# Patient Record
Sex: MALE | Race: WHITE | Employment: FULL TIME | URBAN - METROPOLITAN AREA
[De-identification: names, ages, dates, MRNs, and addresses within clinical notes are randomized per-mention and may not be internally consistent; named-entity substitution may affect disease eponyms.]

---

## 2017-04-17 ENCOUNTER — OFFICE VISIT (OUTPATIENT)
Dept: FAMILY MEDICINE | Facility: CLINIC | Age: 22
End: 2017-04-17
Payer: COMMERCIAL

## 2017-04-17 VITALS
SYSTOLIC BLOOD PRESSURE: 130 MMHG | TEMPERATURE: 98 F | BODY MASS INDEX: 23.21 KG/M2 | DIASTOLIC BLOOD PRESSURE: 78 MMHG | HEIGHT: 75 IN | HEART RATE: 77 BPM | RESPIRATION RATE: 12 BRPM | OXYGEN SATURATION: 99 % | WEIGHT: 186.7 LBS

## 2017-04-17 DIAGNOSIS — E10.9 TYPE 1 DIABETES MELLITUS WITHOUT COMPLICATION (H): ICD-10-CM

## 2017-04-17 DIAGNOSIS — J35.01 CHRONIC TONSILLITIS: Primary | ICD-10-CM

## 2017-04-17 PROCEDURE — 99214 OFFICE O/P EST MOD 30 MIN: CPT | Performed by: FAMILY MEDICINE

## 2017-04-17 NOTE — NURSING NOTE
"Chief Complaint   Patient presents with     Throat Problem       Initial /78 (BP Location: Left arm, Patient Position: Chair, Cuff Size: Adult Regular)  Pulse 77  Temp 98  F (36.7  C) (Oral)  Resp 12  Ht 6' 2.5\" (1.892 m)  Wt 186 lb 11.2 oz (84.7 kg)  SpO2 99%  BMI 23.65 kg/m2 Estimated body mass index is 23.65 kg/(m^2) as calculated from the following:    Height as of this encounter: 6' 2.5\" (1.892 m).    Weight as of this encounter: 186 lb 11.2 oz (84.7 kg).  Medication Reconciliation: complete   Saleem Carrasco CMA       "

## 2017-04-17 NOTE — PROGRESS NOTES
SUBJECTIVE:                                                    Awilda Whitaker is a 21 year old male who presents to clinic today for the following health issues:    Type I diabetes with sore throat for six months

## 2017-04-17 NOTE — PROGRESS NOTES
"  SUBJECTIVE:                                                    Awilda Whitaker is a 21 year old male who presents to clinic today for the following health issues:    Goes to [facility] for all his Endocrine needs: Type I diabetic     Concern - Throat concern     Onset: Over 1 year    Description:   Sore with white clumps and periodically blood with it    Intensity: mild    Progression of Symptoms:  same    Accompanying Signs & Symptoms:  No       Previous history of similar problem:   No    Precipitating factors:   Worsened by: Smoking    Alleviating factors:  Improved by: No       Therapies Tried and outcome: No      Feels a fullness in his throat almost all the time ?? Chronic tonsillits    Problem list and histories reviewed & adjusted, as indicated.  Additional history: as documented    Type I Diabetes  Hx of Depression    Reviewed and updated as needed this visit by clinical staff       Reviewed and updated as needed this visit by Provider         ROS:  Constitutional, HEENT, cardiovascular, pulmonary, gi and gu systems are negative, except as otherwise noted.    OBJECTIVE:                                                    /78 (BP Location: Left arm, Patient Position: Chair, Cuff Size: Adult Regular)  Pulse 77  Temp 98  F (36.7  C) (Oral)  Resp 12  Ht 6' 2.5\" (1.892 m)  Wt 186 lb 11.2 oz (84.7 kg)  SpO2 99%  BMI 23.65 kg/m2  Body mass index is 23.65 kg/(m^2).  GENERAL: healthy, alert and no distress  EYES: Eyes grossly normal to inspection, PERRL and conjunctivae and sclerae normal  HENT: ear canals and TM's normal, nose and mouth without ulcers or lesions  NECK: no adenopathy, no asymmetry, masses, or scars and thyroid normal to palpation  RESP: lungs clear to auscultation - no rales, rhonchi or wheezes  CV: regular rate and rhythm, normal S1 S2, no S3 or S4, no murmur, click or rub, no peripheral edema and peripheral pulses strong  ABDOMEN: soft, nontender, no hepatosplenomegaly, no masses and bowel " sounds normal  MS: no gross musculoskeletal defects noted, no edema  SKIN: no suspicious lesions or rashes  NEURO: Normal strength and tone, mentation intact and speech normal  PSYCH: mentation appears normal, affect normal/bright         ASSESSMENT/PLAN:                                                        Type I diabetes , not well controlled, discussed hyperglycemia as an infection     Has chronic tonsillitis with guido,: see ENT about T and A          Tom Griffin MD  [facility]

## 2017-04-17 NOTE — MR AVS SNAPSHOT
After Visit Summary   4/17/2017    Awilda Whitaker    MRN: [id number]           Patient Information     Date Of Birth          1995        Visit Information        Provider Department      4/17/2017 3:45 PM Tom Griffin MD [facility]        Today's Diagnoses     Chronic tonsillitis    -  1    Type 1 diabetes mellitus without complication (H)           Follow-ups after your visit        Additional Services     OTOLARYNGOLOGY REFERRAL       Your provider has referred you to: N: [facility] [phone]   [email or web address]    Please be aware that coverage of these services is subject to the terms and limitations of your health insurance plan.  Call member services at your health plan with any benefit or coverage questions.      Please bring the following with you to your appointment:    (1) Any X-Rays, CTs or MRIs which have been performed.  Contact the facility where they were done to arrange for  prior to your scheduled appointment.   (2) List of current medications  (3) This referral request   (4) Any documents/labs given to you for this referral                  Who to contact     If you have questions or need follow up information about today's clinic visit or your schedule please contact [facility] directly at [phone].  Normal or non-critical lab and imaging results will be communicated to you by MyChart, letter or phone within 4 business days after the clinic has received the results. If you do not hear from us within 7 days, please contact the clinic through MyChart or phone. If you have a critical or abnormal lab result, we will notify you by phone as soon as possible.  Submit refill requests through [employer] or call your pharmacy and they will forward the refill request to us. Please allow 3 business days for your refill to be completed.          Additional Information About Your  "Visit        [employer] Information     [employer] lets you send messages to your doctor, view your test results, renew your prescriptions, schedule appointments and more. To sign up, go to [email or web address] . Click on \"Log in\" on the left side of the screen, which will take you to the Welcome page. Then click on \"Sign up Now\" on the right side of the page.     You will be asked to enter the access code listed below, as well as some personal information. Please follow the directions to create your username and password.     Your access code is: [email or web address]  Expires: 2017  4:21 PM     Your access code will  in 90 days. If you need help or a new code, please call your [place] clinic or [phone].        Care EveryWhere ID     This is your Care EveryWhere ID. This could be used by other organizations to access your [place] medical records  [id number]        Your Vitals Were     Pulse Temperature Respirations Height Pulse Oximetry BMI (Body Mass Index)    77 98  F (36.7  C) (Oral) 12 6' 2.5\" (1.892 m) 99% 23.65 kg/m2       Blood Pressure from Last 3 Encounters:   17 130/78   16 122/78    Weight from Last 3 Encounters:   17 186 lb 11.2 oz (84.7 kg)   16 187 lb (84.8 kg)              We Performed the Following     OTOLARYNGOLOGY REFERRAL        Primary Care Provider    Physician No Ref-Primary       No address on file        Thank you!     Thank you for choosing [facility]  for your care. Our goal is always to provide you with excellent care. Hearing back from our patients is one way we can continue to improve our services. Please take a few minutes to complete the written survey that you may receive in the mail after your visit with us. Thank you!             Your Updated Medication List - Protect others around you: Learn how to safely use, store and throw away your medicines at www.disposemymeds.org.          This list is accurate as of: 17  4:21 PM.  " Always use your most recent med list.                   Brand Name Dispense Instructions for use    aspirin 81 MG chewable tablet     108 tablet    Take 1 tablet (81 mg) by mouth daily       LANTUS SC          lisinopril 2.5 MG tablet    PRINIVIL/Zestril    90 tablet    Take 1 tablet (2.5 mg) by mouth daily       NOVOLOG FLEXPEN SC

## 2017-05-16 ENCOUNTER — OFFICE VISIT (OUTPATIENT)
Dept: FAMILY MEDICINE | Facility: CLINIC | Age: 22
End: 2017-05-16
Payer: COMMERCIAL

## 2017-05-16 VITALS
BODY MASS INDEX: 22.41 KG/M2 | DIASTOLIC BLOOD PRESSURE: 75 MMHG | SYSTOLIC BLOOD PRESSURE: 120 MMHG | OXYGEN SATURATION: 96 % | WEIGHT: 176.9 LBS | HEART RATE: 72 BPM | TEMPERATURE: 98.2 F | RESPIRATION RATE: 14 BRPM

## 2017-05-16 DIAGNOSIS — H10.32 ACUTE CONJUNCTIVITIS OF LEFT EYE, UNSPECIFIED ACUTE CONJUNCTIVITIS TYPE: Primary | ICD-10-CM

## 2017-05-16 PROCEDURE — 99213 OFFICE O/P EST LOW 20 MIN: CPT | Performed by: FAMILY MEDICINE

## 2017-05-16 RX ORDER — POLYMYXIN B SULFATE AND TRIMETHOPRIM 1; 10000 MG/ML; [USP'U]/ML
1 SOLUTION OPHTHALMIC EVERY 4 HOURS
Qty: 1 BOTTLE | Refills: 0 | Status: SHIPPED | OUTPATIENT
Start: 2017-05-16 | End: 2017-05-23

## 2017-05-16 NOTE — NURSING NOTE
"Chief Complaint   Patient presents with     Eye Problem     possible pink eye       Initial /75 (BP Location: Right arm, Patient Position: Chair, Cuff Size: Adult Large)  Pulse 72  Temp 98.2  F (36.8  C) (Oral)  Resp 14  Wt 176 lb 14.4 oz (80.2 kg)  SpO2 96%  BMI 22.41 kg/m2 Estimated body mass index is 22.41 kg/(m^2) as calculated from the following:    Height as of 4/17/17: 6' 2.5\" (1.892 m).    Weight as of this encounter: 176 lb 14.4 oz (80.2 kg).  Medication Reconciliation: complete   Saleem Carrasco CMA       "

## 2017-05-16 NOTE — PATIENT INSTRUCTIONS
Wash hands  Follow up as needed  Conjunctivitis, Viral    Viral conjunctivitis (sometimes called pink eye) is a common infection of the eye. It is very contagious. Touching the infected eye, then touching another person passes this infection. It can also be spread from one eye to the other in this same way. The most common symptoms include redness, discharge from the eye, swollen eyelids, and a gritty or scratchy feeling in the eye.  This condition will take about 7 to 10 days to go away. Artificial tears (available without a prescription) are often recommended to moisten and clean the eyes. Antibiotic eye drops often are not recommended because they will not kill the virus. But sometimes they may be prescribed by eye doctors. This is to prevent a second, bacterial infection.  Home care    Apply a towel soaked in cool water to the affected eye 3 to 4 times a day (just before applying medicine to the eye).    It is common to have mucus drainage during the night, causing the eyelids to become crusted by morning. Use a warm, wet cloth to wipe this away.    Launder cloths used to clean the eye after one use. Do not reuse them.    If antibiotic medicines are prescribed, take them exactly as directed. Do not stop taking them until you are told to.    You may use acetaminophen or ibuprofen to control pain, unless another medicine was prescribed. (Note:If you have chronic liver or kidney disease, or if you have ever had a stomach ulcer or gastrointestinal bleeding, talk with your healthcare provider before using these medicines.) Aspirin should never be used in anyone under 18 years of age who is ill with a fever. It may cause severe liver damage.    Wash your hands before and after touching the affected eye. This helps to prevent spreading the infection to your other eye and to others.    The infected person should avoid sharing towels, washcloths, and bedding with others. This is to prevent spreading the  infection.    This illness is contagious during the first week. Children with this illness should be kept out of day care and school until the redness clears.  Follow-up care  Follow up with your healthcare provider, or as advised.  When to seek medical advice  Call your healthcare provider right away if any of these occur:    Worsening vision    Increasing pain in the eye    Increasing swelling or redness of the eyelid    Redness spreading to the face around the eye    Large amount of green or yellow drainage from the eye    Severe itching in or around the eye    Fever of 100.4 F (38 C) or higher    3691-8188 [employer]. [address] [place] [address]. All rights reserved. This information is not intended as a substitute for professional medical care. Always follow your healthcare professional's instructions.

## 2017-05-16 NOTE — MR AVS SNAPSHOT
After Visit Summary   5/16/2017    Awilda Whitaker    MRN: [id number]           Patient Information     Date Of Birth          1995        Visit Information        Provider Department      5/16/2017 1:30 PM Nikky Mike MD [facility]        Today's Diagnoses     Acute conjunctivitis of left eye, unspecified acute conjunctivitis type    -  1      Care Instructions      Wash hands  Follow up as needed  Conjunctivitis, Viral    Viral conjunctivitis (sometimes called pink eye) is a common infection of the eye. It is very contagious. Touching the infected eye, then touching another person passes this infection. It can also be spread from one eye to the other in this same way. The most common symptoms include redness, discharge from the eye, swollen eyelids, and a gritty or scratchy feeling in the eye.  This condition will take about 7 to 10 days to go away. Artificial tears (available without a prescription) are often recommended to moisten and clean the eyes. Antibiotic eye drops often are not recommended because they will not kill the virus. But sometimes they may be prescribed by eye doctors. This is to prevent a second, bacterial infection.  Home care    Apply a towel soaked in cool water to the affected eye 3 to 4 times a day (just before applying medicine to the eye).    It is common to have mucus drainage during the night, causing the eyelids to become crusted by morning. Use a warm, wet cloth to wipe this away.    Launder cloths used to clean the eye after one use. Do not reuse them.    If antibiotic medicines are prescribed, take them exactly as directed. Do not stop taking them until you are told to.    You may use acetaminophen or ibuprofen to control pain, unless another medicine was prescribed. (Note:If you have chronic liver or kidney disease, or if you have ever had a stomach ulcer or gastrointestinal bleeding, talk with your healthcare provider before  using these medicines.) Aspirin should never be used in anyone under 18 years of age who is ill with a fever. It may cause severe liver damage.    Wash your hands before and after touching the affected eye. This helps to prevent spreading the infection to your other eye and to others.    The infected person should avoid sharing towels, washcloths, and bedding with others. This is to prevent spreading the infection.    This illness is contagious during the first week. Children with this illness should be kept out of day care and school until the redness clears.  Follow-up care  Follow up with your healthcare provider, or as advised.  When to seek medical advice  Call your healthcare provider right away if any of these occur:    Worsening vision    Increasing pain in the eye    Increasing swelling or redness of the eyelid    Redness spreading to the face around the eye    Large amount of green or yellow drainage from the eye    Severe itching in or around the eye    Fever of 100.4 F (38 C) or higher    0686-8752 [employer]. [address] [place] [address]. All rights reserved. This information is not intended as a substitute for professional medical care. Always follow your healthcare professional's instructions.              Follow-ups after your visit        Who to contact     If you have questions or need follow up information about today's clinic visit or your schedule please contact [facility] directly at [phone].  Normal or non-critical lab and imaging results will be communicated to you by MyChart, letter or phone within 4 business days after the clinic has received the results. If you do not hear from us within 7 days, please contact the clinic through MyChart or phone. If you have a critical or abnormal lab result, we will notify you by phone as soon as possible.  Submit refill requests through [employer] or call your pharmacy and they will forward the refill  "request to us. Please allow 3 business days for your refill to be completed.          Additional Information About Your Visit        [employer] Information     [employer] lets you send messages to your doctor, view your test results, renew your prescriptions, schedule appointments and more. To sign up, go to [email or web address] . Click on \"Log in\" on the left side of the screen, which will take you to the Welcome page. Then click on \"Sign up Now\" on the right side of the page.     You will be asked to enter the access code listed below, as well as some personal information. Please follow the directions to create your username and password.     Your access code is: [email or web address]  Expires: 2017  4:21 PM     Your access code will  in 90 days. If you need help or a new code, please call your [place] clinic or [phone].        Care EveryWhere ID     This is your Care EveryWhere ID. This could be used by other organizations to access your [place] medical records  [id number]        Your Vitals Were     Pulse Temperature Respirations Pulse Oximetry BMI (Body Mass Index)       72 98.2  F (36.8  C) (Oral) 14 96% 22.41 kg/m2        Blood Pressure from Last 3 Encounters:   17 120/75   17 130/78   16 122/78    Weight from Last 3 Encounters:   17 176 lb 14.4 oz (80.2 kg)   17 186 lb 11.2 oz (84.7 kg)   16 187 lb (84.8 kg)              Today, you had the following     No orders found for display         Today's Medication Changes          These changes are accurate as of: 17  2:08 PM.  If you have any questions, ask your nurse or doctor.               Start taking these medicines.        Dose/Directions    trimethoprim-polymyxin b ophthalmic solution   Commonly known as:  POLYTRIM   Used for:  Acute conjunctivitis of left eye, unspecified acute conjunctivitis type   Started by:  Nikky Mike MD        Dose:  1 drop   Apply 1 drop to eye every 4 hours for 7 " days   Quantity:  1 Bottle   Refills:  0            Where to get your medicines      These medications were sent to [facility] - [place], [place] - [address] AT [facility] [address]  [address], [facility] [address]     Phone:  [phone]     trimethoprim-polymyxin b ophthalmic solution                Primary Care Provider    Physician No Ref-Primary       No address on file        Thank you!     Thank you for choosing [facility]  for your care. Our goal is always to provide you with excellent care. Hearing back from our patients is one way we can continue to improve our services. Please take a few minutes to complete the written survey that you may receive in the mail after your visit with us. Thank you!             Your Updated Medication List - Protect others around you: Learn how to safely use, store and throw away your medicines at www.disposemymeds.org.          This list is accurate as of: 5/16/17  2:08 PM.  Always use your most recent med list.                   Brand Name Dispense Instructions for use    aspirin 81 MG chewable tablet     108 tablet    Take 1 tablet (81 mg) by mouth daily       LANTUS SC          lisinopril 2.5 MG tablet    PRINIVIL/Zestril    90 tablet    Take 1 tablet (2.5 mg) by mouth daily       NOVOLOG FLEXPEN SC          trimethoprim-polymyxin b ophthalmic solution    POLYTRIM    1 Bottle    Apply 1 drop to eye every 4 hours for 7 days

## 2017-05-16 NOTE — PROGRESS NOTES
SUBJECTIVE:                                                    Awilda Whitaker is a 21 year old male who presents to clinic today for the following health issues:      Eye(s) Problem     Onset: 5 days    Description:   Location: left  Pain: YES  Redness: YES    Accompanying Signs & Symptoms:  Discharge/mattering: YES  Swelling: no   Visual changes: YES  Fever: no   Nasal Congestion: YES  Bothered by bright lights: no      History:   Trauma: no   Foreign body exposure: no     Precipitating factors:   Wearing contacts: no     Alleviating factors:  Improved by: eye drops        Therapies Tried and outcome: eye drops    Per patient, he has had pink eye for over 5 days now and hasn't been able to even go to work. Has quite a bit of discharge usually in the mornings      Problem list and histories reviewed & adjusted, as indicated.  Additional history: as documented    Patient Active Problem List   Diagnosis     Type 1 diabetes mellitus without complication (H)     History reviewed. No pertinent surgical history.    Social History   Substance Use Topics     Smoking status: Current Every Day Smoker     Packs/day: 0.20     Types: Cigarettes     Smokeless tobacco: Never Used     Alcohol use 4.2 oz/week     7 Standard drinks or equivalent per week     Family History   Problem Relation Age of Onset     Coronary Artery Disease Maternal Grandmother      d at 68     Lung Cancer Maternal Grandfather      Coronary Artery Disease Paternal Grandfather      s/p CABG           Reviewed and updated as needed this visit by clinical staff       Reviewed and updated as needed this visit by Provider         ROS:  Constitutional, HEENT systems are negative, except as otherwise noted.    OBJECTIVE:                                                    /75 (BP Location: Right arm, Patient Position: Chair, Cuff Size: Adult Large)  Pulse 72  Temp 98.2  F (36.8  C) (Oral)  Resp 14  Wt 176 lb 14.4 oz (80.2 kg)  SpO2 96%  BMI 22.41  kg/m2  Body mass index is 22.41 kg/(m^2).  GENERAL: healthy, alert and no distress  EYES: right eye- clear, left eye- no obvious erythema, scant discharge  HENT: normal cephalic/atraumatic, ear canals and TM's normal, nose and mouth without ulcers or lesions, oropharynx clear and oral mucous membranes moist    Diagnostic Test Results:  none      ASSESSMENT/PLAN:                                                        1. Acute conjunctivitis of left eye, unspecified acute conjunctivitis type  - supportive care discussed   - trimethoprim-polymyxin b (POLYTRIM) ophthalmic solution; Apply 1 drop to eye every 4 hours for 7 days  Dispense: 1 Bottle; Refill: 0    See Patient Instructions    Nikky Mike MD  [facility]

## 2017-06-07 ENCOUNTER — TELEPHONE (OUTPATIENT)
Dept: FAMILY MEDICINE | Facility: CLINIC | Age: 22
End: 2017-06-07

## 2017-06-07 NOTE — TELEPHONE ENCOUNTER
Panel Management Review      Patient has the following on his problem list:     Diabetes    ASA: Passed    Last A1C  Lab Results   Component Value Date    A1C 9.7 08/09/2016     A1C tested: Passed    Last LDL:    No results found for: CHOL  No results found for: HDL  No results found for: LDL  No results found for: TRIG  No results found for: CHOLHDLRATIO  No results found for: NHDL    Is the patient on a Statin? No            Is the patient on Aspirin? YES    Medications     Salicylates    aspirin 81 MG chewable tablet          Last three blood pressure readings:  BP Readings from Last 3 Encounters:   05/16/17 120/75   04/17/17 130/78   08/09/16 122/78       Date of last diabetes office visit: 04/17/2017     Tobacco History:     History   Smoking Status     Current Every Day Smoker     Packs/day: 0.20     Types: Cigarettes   Smokeless Tobacco     Never Used           Composite cancer screening  Chart review shows that this patient is due/due soon for the following None  Summary:    Patient is due/failing the following:   A1C    Action needed:   Patient needs office visit for diabetes and labs.    Type of outreach:    Phone, spoke to patient.  he see's another provider for DM, he will complete LJ so we can get records. LJ mailed to patient.    Questions for provider review:    None                                                                                                                                    Lillian Roth CMA       Chart Closed .

## 2017-06-15 ENCOUNTER — TELEPHONE (OUTPATIENT)
Dept: FAMILY MEDICINE | Facility: CLINIC | Age: 22
End: 2017-06-15

## 2017-06-15 NOTE — TELEPHONE ENCOUNTER
Panel Management Review      Patient has the following on his problem list:     Diabetes    ASA: Passed    Last A1C  Lab Results   Component Value Date    A1C 9.7 08/09/2016     A1C tested: FAILED    Last LDL:    No results found for: CHOL  No results found for: HDL  No results found for: LDL  No results found for: TRIG  No results found for: CHOLHDLRATIO  No results found for: NHDL    Is the patient on a Statin? NO             Is the patient on Aspirin? YES    Medications     Salicylates    aspirin 81 MG chewable tablet          Last three blood pressure readings:  BP Readings from Last 3 Encounters:   05/16/17 120/75   04/17/17 130/78   08/09/16 122/78       Date of last diabetes office visit: 08/09/2016     Tobacco History:     History   Smoking Status     Current Every Day Smoker     Packs/day: 0.20     Types: Cigarettes   Smokeless Tobacco     Never Used           Composite cancer screening  Chart review shows that this patient is due/due soon for the following None  Summary:    Patient is due/failing the following:   A1C and STATIN    Action needed:   Routed to provider for review.    Type of outreach:    None, routed to provider for review.    Questions for provider review:    Pt not currently on Statin and diabetic, can you please review to order statin for patient or contraindicate                                                                                                                                    Caryn Coleman/TAMMIE  [place]---[facility]       Chart routed to Provider .

## 2017-12-14 ENCOUNTER — TELEPHONE (OUTPATIENT)
Dept: FAMILY MEDICINE | Facility: CLINIC | Age: 22
End: 2017-12-14

## 2017-12-14 NOTE — TELEPHONE ENCOUNTER
Panel Management Review      Patient has the following on his problem list:     Diabetes    ASA: Passed    Last A1C  Lab Results   Component Value Date    A1C 9.7 08/09/2016     A1C tested: FAILED    Last LDL:    No results found for: CHOL  No results found for: HDL  No results found for: LDL  No results found for: TRIG  No results found for: CHOLHDLRATIO  No results found for: NHDL    Is the patient on a Statin? NO             Is the patient on Aspirin? YES    Medications     Salicylates    aspirin 81 MG chewable tablet          Last three blood pressure readings:  BP Readings from Last 3 Encounters:   05/16/17 120/75   04/17/17 130/78   08/09/16 122/78       Date of last diabetes office visit: 08/09/2016     Tobacco History:     History   Smoking Status     Current Every Day Smoker     Packs/day: 0.20     Types: Cigarettes   Smokeless Tobacco     Never Used             Composite cancer screening  Chart review shows that this patient is due/due soon for the following None  Summary:    Patient is due/failing the following:   A1C    Action needed:   Patient needs office visit for f/u diabetes.    Type of outreach:    Phone, left message for patient to call back.     Questions for provider review:    None                                                                                                                                    Caryn Coleman/TAMMIE  [place]---[facility]       Chart routed to Care Team .

## 2018-04-23 ENCOUNTER — TELEPHONE (OUTPATIENT)
Dept: FAMILY MEDICINE | Facility: CLINIC | Age: 23
End: 2018-04-23

## 2018-04-23 NOTE — TELEPHONE ENCOUNTER
Panel Management Review      Patient has the following on his problem list:     Diabetes    ASA: Passed    Last A1C  Lab Results   Component Value Date    A1C 9.7 08/09/2016     A1C tested: FAILED    Last LDL:    No results found for: CHOL  No results found for: HDL  No results found for: LDL  No results found for: TRIG  No results found for: CHOLHDLRATIO  No results found for: NHDL    Is the patient on a Statin? NO             Is the patient on Aspirin? YES    Medications     Salicylates    aspirin 81 MG chewable tablet          Last three blood pressure readings:  BP Readings from Last 3 Encounters:   05/16/17 120/75   04/17/17 130/78   08/09/16 122/78       Date of last diabetes office visit: 08/09/2016     Tobacco History:     History   Smoking Status     Current Every Day Smoker     Packs/day: 0.20     Types: Cigarettes   Smokeless Tobacco     Never Used           Composite cancer screening  Chart review shows that this patient is due/due soon for the following None  Summary:    Patient is due/failing the following:   A1C    Action needed:   Patient needs office visit for f/u diabetes.    Type of outreach:    Phone, left message for patient to call back.     Questions for provider review:    None                                                                                                                                    Caryn Coleman/TAMMIE  [place]---[facility]       Chart routed to Care Team .

## 2018-11-03 ENCOUNTER — HOSPITAL ENCOUNTER (EMERGENCY)
Facility: CLINIC | Age: 23
Discharge: HOME | End: 2018-11-03
Attending: PHYSICIAN ASSISTANT | Admitting: PHYSICIAN ASSISTANT
Payer: COMMERCIAL

## 2018-11-03 ENCOUNTER — APPOINTMENT (OUTPATIENT)
Dept: GENERAL RADIOLOGY | Facility: CLINIC | Age: 23
End: 2018-11-03
Attending: PHYSICIAN ASSISTANT
Payer: COMMERCIAL

## 2018-11-03 VITALS
RESPIRATION RATE: 16 BRPM | TEMPERATURE: 98 F | DIASTOLIC BLOOD PRESSURE: 99 MMHG | SYSTOLIC BLOOD PRESSURE: 155 MMHG | OXYGEN SATURATION: 97 %

## 2018-11-03 DIAGNOSIS — S61.210A LACERATION OF RIGHT INDEX FINGER WITHOUT FOREIGN BODY WITHOUT DAMAGE TO NAIL, INITIAL ENCOUNTER: ICD-10-CM

## 2018-11-03 PROCEDURE — 90715 TDAP VACCINE 7 YRS/> IM: CPT | Performed by: PHYSICIAN ASSISTANT

## 2018-11-03 PROCEDURE — 25000128 H RX IP 250 OP 636: Performed by: PHYSICIAN ASSISTANT

## 2018-11-03 PROCEDURE — 99283 EMERGENCY DEPT VISIT LOW MDM: CPT | Mod: 25

## 2018-11-03 PROCEDURE — 73140 X-RAY EXAM OF FINGER(S): CPT | Mod: RT

## 2018-11-03 PROCEDURE — 90471 IMMUNIZATION ADMIN: CPT

## 2018-11-03 PROCEDURE — 12002 RPR S/N/AX/GEN/TRNK2.6-7.5CM: CPT

## 2018-11-03 RX ADMIN — CLOSTRIDIUM TETANI TOXOID ANTIGEN (FORMALDEHYDE INACTIVATED), CORYNEBACTERIUM DIPHTHERIAE TOXOID ANTIGEN (FORMALDEHYDE INACTIVATED), BORDETELLA PERTUSSIS TOXOID ANTIGEN (GLUTARALDEHYDE INACTIVATED), BORDETELLA PERTUSSIS FILAMENTOUS HEMAGGLUTININ ANTIGEN (FORMALDEHYDE INACTIVATED), BORDETELLA PERTUSSIS PERTACTIN ANTIGEN, AND BORDETELLA PERTUSSIS FIMBRIAE 2/3 ANTIGEN 0.5 ML: 5; 2; 2.5; 5; 3; 5 INJECTION, SUSPENSION INTRAMUSCULAR at 20:44

## 2018-11-03 ASSESSMENT — ENCOUNTER SYMPTOMS: WOUND: 1

## 2018-11-03 NOTE — ED AVS SNAPSHOT
[facility] Emergency Department    [address]    [facility] [address]    Phone:  [phone]    Fax:  [phone]                                       Awilda Whitaker   MRN: [id number]    Department:  [facility] Emergency Department   Date of Visit:  11/3/2018           Patient Information     Date Of Birth          1995        Your diagnoses for this visit were:     Laceration of right index finger without foreign body without damage to nail, initial encounter        You were seen by Felicia Pearce PA-C.      Follow-up Information     Follow up with Primary Care Provider.    Why:  in 7-10 days for suture removal.        Follow up with [facility] Emergency Department.    Specialty:  EMERGENCY MEDICINE    Why:  As needed, If symptoms worsen or if you develop any signs of infection such as redness, swelling, drainage, fever    Contact information:    [address]  [facility] [address]  [phone]        Discharge Instructions       Discharge Instructions  Laceration (Cut)    You were seen today for a laceration (cut).  Your provider examined your laceration for any problems such a buried foreign body (like glass, a splinter, or gravel), or injury to blood vessels, tendons, and nerves.  Your provider may have also rinsed and/or scrubbed your laceration to help prevent an infection. It may not be possible to find all problems with your laceration on the first visit; occasionally foreign bodies or a tendon injury can go undetected.    Your laceration may have been closed in one of several ways:    No closure: many wounds will heal just fine without closure.    Stitches: regular stitches that require removal.    Staples: skin staples are often used in the scalp/head.    Wound adhesive (glue): skin glue can be used for certain lacerations and doesn t require removal.    Wound strips (aka Butterfly bandages or steri-strips): these are  bandages that help to close a wound.    Absorbable stitches:  dissolving  stitches that go away on their own and usually don t require removal.    A small percentage of wounds will develop an infection regardless of how well the wound is cared for. Antibiotics are generally not indicated to prevent an infection so are only given for a small number of high-risk wounds. Some lacerations are too high risk to close, and are left open to heal because closure can increase the likelihood that an infection will develop.    Remember that all lacerations, no matter how expertly repaired, will cause scarring. We consider many factors, techniques, and materials, in our efforts to provide the best possible cosmetic outcome.    Generally, every Emergency Department visit should have a follow-up clinic visit with either a primary or a specialty clinic/provider. Please follow-up as instructed by your emergency provider today.     Return to the Emergency Department right away if:    You have more redness, swelling, pain, drainage (pus), a bad smell, or red streaking from your laceration as these symptoms could indicate an infection.    You have a fever of 100.4 F or more.    You have bleeding that you cannot stop at home. If your cut starts to bleed, hold pressure on the bleeding area with a clean cloth or put pressure over the bandage.  If the bleeding does not stop after using constant pressure for 30 minutes, you should return to the Emergency Department for further treatment.    An area past the laceration is cool, pale, or blue compared with the other side, or has a slower return of color when squeezed.    Your dressing seems too tight or starts to get uncomfortable or painful. For children, signs of a problem might be irritability or restlessness.    You have loss of normal function or use of an area, such as being unable to straighten or bend a finger normally.    You have a numb area past the laceration.    Return to the  Emergency Department or see your regular provider if:    The laceration starts to come open.     You have something coming out of the cut or a feeling that there is something in the laceration.    Your wound will not heal, or keeps breaking open. There can always be glass, wood, dirt or other things in any wound.  They will not always show up, even on x-rays.  If a wound does not heal, this may be why, and it is important to follow-up with your regular provider.    Home Care:    Take your dressing off in 12-24 hours, or as instructed by your provider, to check your laceration. Remove the dressing sooner if it seems too tight or painful, or if it is getting numb, tingly, or pale past the dressing.    Gently wash your laceration 1-2 times daily with clean water and mild soap. It is okay to shower or run clean water over the laceration, but do not let the laceration soak in water (no swimming).    If your laceration was closed with wound adhesive or strips: pat it dry and leave it open to the air. For all other repairs: after you wash your laceration, or at least 2 times a day, apply antibiotic ointment (such as Neosporin  or Bacitracin ) to the laceration, then cover it with a Band-Aid  or gauze.    Keep the laceration clean. Wear gloves or other protective clothing if you are around dirt.    Follow-up for removal:    If your wound was closed with staples or regular stitches, they need to be removed according to the instructions and timeline specified by your provider today.    If your wound was closed with absorbable ( dissolving ) sutures, they should fall out, dissolve, or not be visible in about one week. If they are still visible, then they should be removed according to the instructions and timeline specified by your provider today.    Scars:  To help minimize scarring:    Wear sunscreen over the healed laceration when out in the sun.    Massage the area regularly once healed.    You may apply Vitamin E to the  healed wound.    Wait. Scars improve in appearance over months and years.    If you were given a prescription for medicine here today, be sure to read all of the information (including the package insert) that comes with your prescription.  This will include important information about the medicine, its side effects, and any warnings that you need to know about.  The pharmacist who fills the prescription can provide more information and answer questions you may have about the medicine.  If you have questions or concerns that the pharmacist cannot address, please call or return to the Emergency Department.       Remember that you can always come back to the Emergency Department if you are not able to see your regular provider in the amount of time listed above, if you get any new symptoms, or if there is anything that worries you.      24 Hour Appointment Hotline       To make an appointment at any [facility], call [phone] ([phone]). If you don't have a family doctor or clinic, we will help you find one. [facility] are conveniently located to serve the needs of you and your family.             Review of your medicines      Our records show that you are taking the medicines listed below. If these are incorrect, please call your family doctor or clinic.        Dose / Directions Last dose taken    aspirin 81 MG chewable tablet   Dose:  81 mg   Quantity:  108 tablet        Take 1 tablet (81 mg) by mouth daily   Refills:  3        LANTUS SC        Refills:  0        lisinopril 2.5 MG tablet   Commonly known as:  PRINIVIL/Zestril   Dose:  2.5 mg   Quantity:  90 tablet        Take 1 tablet (2.5 mg) by mouth daily   Refills:  3        NOVOLOG FLEXPEN SC        Refills:  0                Procedures and tests performed during your visit     XR Finger Right G/E 2 Views      Orders Needing Specimen Collection     None      Pending Results     No orders found from 11/1/2018 to 11/4/2018.             Pending Culture Results     No orders found from 11/1/2018 to 11/4/2018.            Pending Results Instructions     If you had any lab results that were not finalized at the time of your Discharge, you can call the ED Lab Result RN at [phone]. You will be contacted by this team for any positive Lab results or changes in treatment. The nurses are available 7 days a week from 10A to 6:30P.  You can leave a message 24 hours per day and they will return your call.        Test Results From Your Hospital Stay        11/3/2018  9:16 PM      Narrative     FINGER RIGHT TWO OR MORE VIEWS   11/3/2018 8:58 PM     HISTORY: Laceration over proximal phalanx, evaluate for foreign body.     COMPARISON: None.        Impression     IMPRESSION: No radiopaque foreign object is identified. No evidence of  fracture. Bony alignment is within normal limits.    SHANTHI RODRIGUEZ MD                Clinical Quality Measure: Blood Pressure Screening     Your blood pressure was checked while you were in the emergency department today. The last reading we obtained was  BP: (!) 155/99 . Please read the guidelines below about what these numbers mean and what you should do about them.  If your systolic blood pressure (the top number) is less than 120 and your diastolic blood pressure (the bottom number) is less than 80, then your blood pressure is normal. There is nothing more that you need to do about it.  If your systolic blood pressure (the top number) is 120-139 or your diastolic blood pressure (the bottom number) is 80-89, your blood pressure may be higher than it should be. You should have your blood pressure rechecked within a year by a primary care provider.  If your systolic blood pressure (the top number) is 140 or greater or your diastolic blood pressure (the bottom number) is 90 or greater, you may have high blood pressure. High blood pressure is treatable, but if left untreated over time it can put you at risk for heart attack,  "stroke, or kidney failure. You should have your blood pressure rechecked by a primary care provider within the next 4 weeks.  If your provider in the emergency department today gave you specific instructions to follow-up with your doctor or provider even sooner than that, you should follow that instruction and not wait for up to 4 weeks for your follow-up visit.        Thank you for choosing [facility]       Thank you for choosing [facility] for your care. Our goal is always to provide you with excellent care. Hearing back from our patients is one way we can continue to improve our services. Please take a few minutes to complete the written survey that you may receive in the mail after you visit with us. Thank you!        "[employer]hart Information     [email or web address] lets you send messages to your doctor, view your test results, renew your prescriptions, schedule appointments and more. To sign up, go to [email or web address] . Click on \"Log in\" on the left side of the screen, which will take you to the Welcome page. Then click on \"Sign up Now\" on the right side of the page.     You will be asked to enter the access code listed below, as well as some personal information. Please follow the directions to create your username and password.     Your access code is: [email or web address]  Expires: 2019  9:38 PM     Your access code will  in 90 days. If you need help or a new code, please call your [facility] clinic or [phone].        Care EveryWhere ID     This is your Care EveryWhere ID. This could be used by other organizations to access your [facility] medical records  [id number]        Equal Access to Services     VLAD JOSEPH : Hadmalathi Gonzalez, waaxda luqadaha, qaybta kaalmada janee, yordan black. So [facility] [phone].    ATENCIÓN: Si habla español, tiene a robins disposición servicios gratuitos de asistencia lingüística. Llame al [phone].    We comply with applicable federal " civil rights laws and [place] laws. We do not discriminate on the basis of race, color, national origin, age, disability, sex, sexual orientation, or gender identity.            After Visit Summary       This is your record. Keep this with you and show to your community pharmacist(s) and doctor(s) at your next visit.

## 2018-11-03 NOTE — ED AVS SNAPSHOT
[facility] Emergency Department    [address]    [facility] [address]    Phone:  [phone]    Fax:  [phone]                                       Awilda Whitaker   MRN: [id number]    Department:  [facility] Emergency Department   Date of Visit:  11/3/2018           After Visit Summary Signature Page     I have received my discharge instructions, and my questions have been answered. I have discussed any challenges I see with this plan with the nurse or doctor.    ..........................................................................................................................................  Patient/Patient Representative Signature      ..........................................................................................................................................  Patient Representative Print Name and Relationship to Patient    ..................................................               ................................................  Date                                   Time    ..........................................................................................................................................  Reviewed by Signature/Title    ...................................................              ..............................................  Date                                               Time          22EPIC Rev 08/18

## 2018-11-04 ENCOUNTER — NURSE TRIAGE (OUTPATIENT)
Dept: NURSING | Facility: CLINIC | Age: 23
End: 2018-11-04

## 2018-11-04 NOTE — TELEPHONE ENCOUNTER
Reason for Call/Nurse Assessment:  24 y/o male calls about ER visit to [facility] tonight for cutting finger on a ramirez jar, he had 7 stitches placed. He woke up just now and noticed some more blood on the bandage and was nervous.  Denies that bandage is soaking, does not think it is bleedng now, may have slept on it funny/wrong. Does not complain of increased pain or pain.    RN Action/Disposiiton:  Reviewed protocols briefly, protocols do not quite fit the situation, but advised caller of NSAID's for comfort, elevation of the sutured finger on pillow while sleeping, re-wrap or add more gauze with a little pressure, may also add pressure to wound for 10 minutes if it bleeds again, RN advised to call back with any changes, worsening of symptoms, and questions or concerns.   Patient verbalized understanding of and agreement with plan and had no further questions.     Mahogany Martinez RN - [facility] Nurse Advisor  11/4/2018   2:22AM    Additional Information    Negative: [1] Major abdominal surgical wound AND [2] visible internal organs    Negative: Sounds like a life-threatening emergency to the triager    Negative: [1] Bleeding from wound AND [2] won't stop after 10 minutes of direct pressure    Negative: [1] Suture came out early AND [2] wound gaping AND [3] < 48 hours since sutures placed    Negative: Patient sounds very sick or weak to the triager    Negative: [1] Wound gaping open AND [2] length of opening > 1/2 inch (6 mm) AND [3] > 48 hours since sutures placed    Negative: [1] Wound gaping open AND [2] on the face AND [3] > 48 hours since sutures placed    Negative: Suture removal is overdue    Negative: [1] Suture came out early AND [2] > 48 hours since sutures placed AND [3] caller wants wound checked    Negative: [1] Numbness extends beyond the wound edges AND [2] lasts > 8 hours    Care of sutured wound,  questions about    Negative: [1] Suture came out early AND [2] > 48 hours since sutures placed  (all triage  questions negative)    Protocols used: SUTURE OR STAPLE QUESTIONS-ADULT-AH

## 2018-11-04 NOTE — ED PROVIDER NOTES
History     Chief Complaint:  Laceration     HPI   Awilda Whitaker is a 23 year old type 1 diabetic male, left-handed, who presents to the emergency department today for evaluation of laceration to his right index finger. Patient states he was washing dishes with a sponge when he lacerated his right index finger distally of the MCP joint with a glass cup after forcing the sponge into the cup. Patient notes numbness surrounding the laceration, but sensation intact to the tip of the finger. Patient is uncertain if there was infiltration of glass, patient's friend notes this was unlikely. Of note, patient's last tetanus per [facility] was 2001.    Allergies:  No Known Drug Allergies     Medications:    Novolog  Lantus  Prinivil     Past Medical History:    Type 1 diabetes mellitus  Diabetic ketoacidoses     Past Surgical History:    History reviewed. No pertinent past surgical history.     Family History:    Maternal grandmother: CAD  Maternal grandfather: Lung cancer  Paternal grandfather: CAD and CABG    Social History:  The patient was accompanied to the ED by friend.  Smoking Status: Current Every Day Smoker   Packs/day: 0.20   Types: Cigarettes  Smokeless Tobacco: Never Used  Alcohol Use: Positive  Marital Status: Single      Review of Systems   Skin: Positive for wound.   All other systems reviewed and are negative.    Physical Exam     Patient Vitals for the past 24 hrs:   BP Temp Temp src Heart Rate Resp SpO2   11/03/18 2002 (!) 155/99 98  F (36.7  C) Temporal - - -   11/03/18 2001 - - - 115 16 97 %     Physical Exam   Constitutional: He is oriented to person, place, and time. No distress.   Pulmonary/Chest: Effort normal. No respiratory distress.   Musculoskeletal:        Right hand: He exhibits laceration. He exhibits normal range of motion, no bony tenderness, normal two-point discrimination, normal capillary refill and no deformity. Normal sensation noted. Normal strength noted.   4 cm laceration over the dorsal  aspect of the right second digit over the proximal phalanx and extending just to the PIP. There is no evidence that this penetrates the joint space. He has distal 2 point discrimination intact. Full flexion and extension of the digit at the DIP, PIP, and MCP suggesting no tendon injury.    Neurological: He is alert and oriented to person, place, and time. No cranial nerve deficit.   Skin: Skin is warm and dry. Laceration (right index finger) noted.   Psychiatric: He has a normal mood and affect.         Emergency Department Course     Imaging:  Radiology findings were communicated with the patient who voiced understanding of the findings.    XR Finger Right G/E 2 Views  No radiopaque foreign object is identified. No evidence of  fracture. Bony alignment is within normal limits.  SHANTHI RODRIGUEZ MD  Reading per radiology    Procedures:       Laceration Repair:    Performed by: Felicia Pearce  Authorized by: Felicia Pearce  Consent given by: Patient who states understanding of the procedure being performed after discussing the risks, benefits and alternatives.    Preparation: Patient was prepped and draped in usual sterile fashion.  Irrigation solution: saline    Body area: right index finger  Laceration length: 4cm  Contamination: The wound is not contaminated.  Foreign bodies:none  Tendon involvement: none  Anesthesia: Local  Local anesthetic: Lidocaine     1%  Anesthetic total: 7ml    Debridement: none  Skin closure: Closed with 7 x 4.0 Ethilon  Technique: interrupted  Approximation: close  Approximation difficulty: simple    Patient tolerance: Patient tolerated the procedure well with no immediate complications.      Interventions:  2044 Adacel 0.5 mL IM     Emergency Department Course:    2008 Nursing notes and vitals reviewed.    2010 I performed an exam of the patient as documented above.     2030 The patient was sent for a x-ray while in the emergency department, results above.     2115 I performed laceration  repair as documented above.     2140 Recheck and update.     I personally reviewed the imaging results with the patient and answered all related questions prior to discharge.    Impression & Plan      Medical Decision Making:  Awilda Whitaker is a 23 year old male presented to the Emergency Department with a right index finger laceration.  Given the time of the injury and nature of the laceration, the wound was felt amenable to primary closure. After adequate anesthesia was obtained, the wound was thoroughly irrigated and examined/explored. There is no evidence of muscular, tendon, or bony involvement at this time, nor signs or symptoms of neurovascular compromise.  Additionally, given the mechanism of injury and examination, suspicion for a foreign body was high and imaging was done, which was negative for fracture or foreign body.  The laceration was repaired as noted above.   We discussed appropriate wound care instructions including keeping the wound dry for the first 24-48 hours, followed be gentle cleansing with soap and water.  We discussed application of sunscreen to the affected area once scar has formed, to minimize long term scar formation.  Warning signs of infection (erythema, warmth, worsening pain, drainage of pus) were discussed, which should prompt return to the ER for re-evaluation and the patient verbalized understanding.  Will plan for suture/staple removal in 10-14 days.  Patient was encouraged to return to the ER or follow-up with PCP in the meantime should any new or troubling symptoms develop.      Diagnosis:    ICD-10-CM   1. Laceration of right index finger without foreign body without damage to nail, initial encounter S61.210A     Disposition:   The patient is discharged to home.    Scribe Disclosure:  I, Carlos Enrique Beltran, am serving as a scribe at 8:08 PM on 11/3/2018 to document services personally performed by Felicia Pearce PA-C based on my observations and the provider's statements to  me.    [facility] EMERGENCY DEPARTMENT       Felicia Pearce PA-C  11/03/18 0981

## 2018-11-04 NOTE — ED TRIAGE NOTES
Patient presents to ED due to laceration to R index finger.  State he was washing dishes with sponge lacerating R index finger with glass cup       Hx DM I

## 2019-12-15 ENCOUNTER — HOSPITAL ENCOUNTER (INPATIENT)
Facility: CLINIC | Age: 24
LOS: 2 days | Discharge: HOME | End: 2019-12-17
Attending: EMERGENCY MEDICINE | Admitting: INTERNAL MEDICINE
Payer: COMMERCIAL

## 2019-12-15 DIAGNOSIS — G47.09 OTHER INSOMNIA: Primary | ICD-10-CM

## 2019-12-15 DIAGNOSIS — F10.10 ALCOHOL ABUSE: ICD-10-CM

## 2019-12-15 DIAGNOSIS — E10.10 DIABETIC KETOACIDOSIS WITHOUT COMA ASSOCIATED WITH TYPE 1 DIABETES MELLITUS (H): ICD-10-CM

## 2019-12-15 PROBLEM — E11.10 DKA (DIABETIC KETOACIDOSES): Status: ACTIVE | Noted: 2019-12-15

## 2019-12-15 LAB
ALBUMIN SERPL-MCNC: 5 G/DL (ref 3.4–5)
ALBUMIN UR-MCNC: 70 MG/DL
ALP SERPL-CCNC: 136 U/L (ref 40–150)
ALT SERPL W P-5'-P-CCNC: 54 U/L (ref 0–70)
ANION GAP SERPL CALCULATED.3IONS-SCNC: 13 MMOL/L (ref 3–14)
ANION GAP SERPL CALCULATED.3IONS-SCNC: 15 MMOL/L (ref 6–17)
ANION GAP SERPL CALCULATED.3IONS-SCNC: 21 MMOL/L (ref 3–14)
APPEARANCE UR: CLEAR
AST SERPL W P-5'-P-CCNC: 41 U/L (ref 0–45)
BASE DEFICIT BLDV-SCNC: 12.2 MMOL/L
BASOPHILS # BLD AUTO: 0.1 10E9/L (ref 0–0.2)
BASOPHILS NFR BLD AUTO: 0.5 %
BILIRUB SERPL-MCNC: 1 MG/DL (ref 0.2–1.3)
BILIRUB UR QL STRIP: NEGATIVE
BUN SERPL-MCNC: 14 MG/DL (ref 7–30)
BUN SERPL-MCNC: 16 MG/DL (ref 7–30)
BUN SERPL-MCNC: 18 MG/DL (ref 5–24)
CA-I BLD-SCNC: 4.8 MG/DL (ref 4.4–5.2)
CALCIUM SERPL-MCNC: 8.2 MG/DL (ref 8.5–10.1)
CALCIUM SERPL-MCNC: 9.1 MG/DL (ref 8.5–10.1)
CHLORIDE BLD-SCNC: 104 MMOL/L (ref 94–109)
CHLORIDE SERPL-SCNC: 105 MMOL/L (ref 94–109)
CHLORIDE SERPL-SCNC: 99 MMOL/L (ref 94–109)
CO2 BLD-SCNC: 12 MMOL/L (ref 20–32)
CO2 BLDCOV-SCNC: 11 MMOL/L (ref 21–28)
CO2 SERPL-SCNC: 10 MMOL/L (ref 20–32)
CO2 SERPL-SCNC: 15 MMOL/L (ref 20–32)
COLOR UR AUTO: ABNORMAL
CREAT BLD-MCNC: 0.8 MG/DL (ref 0.66–1.25)
CREAT SERPL-MCNC: 0.67 MG/DL (ref 0.66–1.25)
CREAT SERPL-MCNC: 0.83 MG/DL (ref 0.66–1.25)
DIFFERENTIAL METHOD BLD: ABNORMAL
EOSINOPHIL # BLD AUTO: 0 10E9/L (ref 0–0.7)
EOSINOPHIL NFR BLD AUTO: 0.2 %
ERYTHROCYTE [DISTWIDTH] IN BLOOD BY AUTOMATED COUNT: 11.3 % (ref 10–15)
ETHANOL SERPL-MCNC: 0.02 G/DL
GFR SERPL CREATININE-BSD FRML MDRD: >90 ML/MIN/{1.73_M2}
GLUCOSE BLD-MCNC: 270 MG/DL (ref 70–99)
GLUCOSE BLDC GLUCOMTR-MCNC: 175 MG/DL (ref 70–99)
GLUCOSE BLDC GLUCOMTR-MCNC: 177 MG/DL (ref 70–99)
GLUCOSE BLDC GLUCOMTR-MCNC: 189 MG/DL (ref 70–99)
GLUCOSE BLDC GLUCOMTR-MCNC: 210 MG/DL (ref 70–99)
GLUCOSE BLDC GLUCOMTR-MCNC: 255 MG/DL (ref 70–99)
GLUCOSE SERPL-MCNC: 183 MG/DL (ref 70–99)
GLUCOSE SERPL-MCNC: 256 MG/DL (ref 70–99)
GLUCOSE UR STRIP-MCNC: >1000 MG/DL
HBA1C MFR BLD: 7.1 % (ref 0–5.6)
HCO3 BLDV-SCNC: 14 MMOL/L (ref 21–28)
HCT VFR BLD AUTO: 51.5 % (ref 40–53)
HCT VFR BLD CALC: 53 %PCV (ref 40–53)
HGB BLD CALC-MCNC: 18 G/DL (ref 13.3–17.7)
HGB BLD-MCNC: 17.1 G/DL (ref 13.3–17.7)
HGB UR QL STRIP: ABNORMAL
IMM GRANULOCYTES # BLD: 0 10E9/L (ref 0–0.4)
IMM GRANULOCYTES NFR BLD: 0.3 %
KETONES BLD-SCNC: 4.9 MMOL/L (ref 0–0.6)
KETONES UR STRIP-MCNC: >150 MG/DL
LACTATE BLD-SCNC: 1.8 MMOL/L (ref 0.7–2)
LACTATE BLD-SCNC: 4 MMOL/L (ref 0.7–2.1)
LACTATE BLD-SCNC: 4.1 MMOL/L (ref 0.7–2)
LEUKOCYTE ESTERASE UR QL STRIP: NEGATIVE
LIPASE SERPL-CCNC: 40 U/L (ref 73–393)
LYMPHOCYTES # BLD AUTO: 1 10E9/L (ref 0.8–5.3)
LYMPHOCYTES NFR BLD AUTO: 7.9 %
MAGNESIUM SERPL-MCNC: 2 MG/DL (ref 1.6–2.3)
MCH RBC QN AUTO: 32.7 PG (ref 26.5–33)
MCHC RBC AUTO-ENTMCNC: 33.2 G/DL (ref 31.5–36.5)
MCV RBC AUTO: 99 FL (ref 78–100)
MONOCYTES # BLD AUTO: 0.8 10E9/L (ref 0–1.3)
MONOCYTES NFR BLD AUTO: 6.1 %
MUCOUS THREADS #/AREA URNS LPF: PRESENT /LPF
NEUTROPHILS # BLD AUTO: 10.9 10E9/L (ref 1.6–8.3)
NEUTROPHILS NFR BLD AUTO: 85 %
NITRATE UR QL: NEGATIVE
NRBC # BLD AUTO: 0 10*3/UL
NRBC BLD AUTO-RTO: 0 /100
O2/TOTAL GAS SETTING VFR VENT: ABNORMAL %
OXYHGB MFR BLDV: 86 %
PCO2 BLDV: 31 MM HG (ref 40–50)
PCO2 BLDV: 35 MM HG (ref 40–50)
PH BLDV: 7.11 PH (ref 7.32–7.43)
PH BLDV: 7.25 PH (ref 7.32–7.43)
PH UR STRIP: 5.5 PH (ref 5–7)
PHOSPHATE SERPL-MCNC: 2.3 MG/DL (ref 2.5–4.5)
PLATELET # BLD AUTO: 284 10E9/L (ref 150–450)
PO2 BLDV: 34 MM HG (ref 25–47)
PO2 BLDV: 53 MM HG (ref 25–47)
POTASSIUM BLD-SCNC: 5.5 MMOL/L (ref 3.4–5.3)
POTASSIUM SERPL-SCNC: 4.6 MMOL/L (ref 3.4–5.3)
POTASSIUM SERPL-SCNC: 5.6 MMOL/L (ref 3.4–5.3)
PROT SERPL-MCNC: 8.6 G/DL (ref 6.8–8.8)
RBC # BLD AUTO: 5.23 10E12/L (ref 4.4–5.9)
RBC #/AREA URNS AUTO: <1 /HPF (ref 0–2)
SAO2 % BLDV FROM PO2: 47 %
SODIUM BLD-SCNC: 131 MMOL/L (ref 133–144)
SODIUM SERPL-SCNC: 130 MMOL/L (ref 133–144)
SODIUM SERPL-SCNC: 133 MMOL/L (ref 133–144)
SOURCE: ABNORMAL
SP GR UR STRIP: 1.02 (ref 1–1.03)
UROBILINOGEN UR STRIP-MCNC: NORMAL MG/DL (ref 0–2)
WBC # BLD AUTO: 12.9 10E9/L (ref 4–11)
WBC #/AREA URNS AUTO: <1 /HPF (ref 0–5)

## 2019-12-15 PROCEDURE — 99285 EMERGENCY DEPT VISIT HI MDM: CPT | Mod: 25

## 2019-12-15 PROCEDURE — 25000128 H RX IP 250 OP 636: Performed by: EMERGENCY MEDICINE

## 2019-12-15 PROCEDURE — 00000146 ZZHCL STATISTIC GLUCOSE BY METER IP

## 2019-12-15 PROCEDURE — 25000132 ZZH RX MED GY IP 250 OP 250 PS 637: Performed by: EMERGENCY MEDICINE

## 2019-12-15 PROCEDURE — 83690 ASSAY OF LIPASE: CPT | Performed by: EMERGENCY MEDICINE

## 2019-12-15 PROCEDURE — 83735 ASSAY OF MAGNESIUM: CPT | Performed by: INTERNAL MEDICINE

## 2019-12-15 PROCEDURE — 82803 BLOOD GASES ANY COMBINATION: CPT

## 2019-12-15 PROCEDURE — 80048 BASIC METABOLIC PNL TOTAL CA: CPT | Performed by: INTERNAL MEDICINE

## 2019-12-15 PROCEDURE — 81001 URINALYSIS AUTO W/SCOPE: CPT | Performed by: EMERGENCY MEDICINE

## 2019-12-15 PROCEDURE — 85025 COMPLETE CBC W/AUTO DIFF WBC: CPT | Performed by: EMERGENCY MEDICINE

## 2019-12-15 PROCEDURE — 25800025 ZZH RX 258: Performed by: EMERGENCY MEDICINE

## 2019-12-15 PROCEDURE — 20000003 ZZH R&B ICU

## 2019-12-15 PROCEDURE — 96361 HYDRATE IV INFUSION ADD-ON: CPT

## 2019-12-15 PROCEDURE — 87641 MR-STAPH DNA AMP PROBE: CPT | Performed by: INTERNAL MEDICINE

## 2019-12-15 PROCEDURE — 93005 ELECTROCARDIOGRAM TRACING: CPT

## 2019-12-15 PROCEDURE — 80047 BASIC METABLC PNL IONIZED CA: CPT

## 2019-12-15 PROCEDURE — 83605 ASSAY OF LACTIC ACID: CPT | Performed by: EMERGENCY MEDICINE

## 2019-12-15 PROCEDURE — 96365 THER/PROPH/DIAG IV INF INIT: CPT

## 2019-12-15 PROCEDURE — 25800030 ZZH RX IP 258 OP 636: Performed by: EMERGENCY MEDICINE

## 2019-12-15 PROCEDURE — 99223 1ST HOSP IP/OBS HIGH 75: CPT | Mod: AI | Performed by: INTERNAL MEDICINE

## 2019-12-15 PROCEDURE — 82805 BLOOD GASES W/O2 SATURATION: CPT | Performed by: INTERNAL MEDICINE

## 2019-12-15 PROCEDURE — 82010 KETONE BODYS QUAN: CPT | Performed by: INTERNAL MEDICINE

## 2019-12-15 PROCEDURE — 36415 COLL VENOUS BLD VENIPUNCTURE: CPT | Performed by: INTERNAL MEDICINE

## 2019-12-15 PROCEDURE — 80320 DRUG SCREEN QUANTALCOHOLS: CPT | Performed by: EMERGENCY MEDICINE

## 2019-12-15 PROCEDURE — 25000125 ZZHC RX 250: Performed by: INTERNAL MEDICINE

## 2019-12-15 PROCEDURE — 83605 ASSAY OF LACTIC ACID: CPT

## 2019-12-15 PROCEDURE — 83036 HEMOGLOBIN GLYCOSYLATED A1C: CPT | Performed by: EMERGENCY MEDICINE

## 2019-12-15 PROCEDURE — HZ2ZZZZ DETOXIFICATION SERVICES FOR SUBSTANCE ABUSE TREATMENT: ICD-10-PCS | Performed by: INTERNAL MEDICINE

## 2019-12-15 PROCEDURE — 83605 ASSAY OF LACTIC ACID: CPT | Performed by: INTERNAL MEDICINE

## 2019-12-15 PROCEDURE — C9113 INJ PANTOPRAZOLE SODIUM, VIA: HCPCS | Performed by: INTERNAL MEDICINE

## 2019-12-15 PROCEDURE — 87640 STAPH A DNA AMP PROBE: CPT | Performed by: INTERNAL MEDICINE

## 2019-12-15 PROCEDURE — 25000125 ZZHC RX 250: Performed by: EMERGENCY MEDICINE

## 2019-12-15 PROCEDURE — 25000128 H RX IP 250 OP 636: Performed by: INTERNAL MEDICINE

## 2019-12-15 PROCEDURE — 80053 COMPREHEN METABOLIC PANEL: CPT | Performed by: EMERGENCY MEDICINE

## 2019-12-15 PROCEDURE — 85014 HEMATOCRIT: CPT

## 2019-12-15 PROCEDURE — 25800030 ZZH RX IP 258 OP 636: Performed by: INTERNAL MEDICINE

## 2019-12-15 PROCEDURE — 84100 ASSAY OF PHOSPHORUS: CPT | Performed by: INTERNAL MEDICINE

## 2019-12-15 PROCEDURE — 96375 TX/PRO/DX INJ NEW DRUG ADDON: CPT

## 2019-12-15 RX ORDER — NALOXONE HYDROCHLORIDE 0.4 MG/ML
.1-.4 INJECTION, SOLUTION INTRAMUSCULAR; INTRAVENOUS; SUBCUTANEOUS
Status: DISCONTINUED | OUTPATIENT
Start: 2019-12-15 | End: 2019-12-17 | Stop reason: HOSPADM

## 2019-12-15 RX ORDER — LORAZEPAM 2 MG/ML
1-2 INJECTION INTRAMUSCULAR EVERY 30 MIN PRN
Status: DISCONTINUED | OUTPATIENT
Start: 2019-12-15 | End: 2019-12-17 | Stop reason: HOSPADM

## 2019-12-15 RX ORDER — LORAZEPAM 2 MG/ML
.5-1 INJECTION INTRAMUSCULAR EVERY 4 HOURS PRN
Status: CANCELLED | OUTPATIENT
Start: 2019-12-15

## 2019-12-15 RX ORDER — POTASSIUM CHLORIDE 1.5 G/1.58G
20-40 POWDER, FOR SOLUTION ORAL
Status: DISCONTINUED | OUTPATIENT
Start: 2019-12-15 | End: 2019-12-17 | Stop reason: HOSPADM

## 2019-12-15 RX ORDER — SODIUM CHLORIDE 450 MG/100ML
1000 INJECTION, SOLUTION INTRAVENOUS CONTINUOUS
Status: DISCONTINUED | OUTPATIENT
Start: 2019-12-15 | End: 2019-12-15

## 2019-12-15 RX ORDER — SODIUM CHLORIDE 9 MG/ML
INJECTION, SOLUTION INTRAVENOUS CONTINUOUS
Status: DISCONTINUED | OUTPATIENT
Start: 2019-12-15 | End: 2019-12-15

## 2019-12-15 RX ORDER — ONDANSETRON 2 MG/ML
4 INJECTION INTRAMUSCULAR; INTRAVENOUS EVERY 6 HOURS PRN
Status: DISCONTINUED | OUTPATIENT
Start: 2019-12-15 | End: 2019-12-17 | Stop reason: HOSPADM

## 2019-12-15 RX ORDER — DEXTROSE MONOHYDRATE 25 G/50ML
25-50 INJECTION, SOLUTION INTRAVENOUS
Status: DISCONTINUED | OUTPATIENT
Start: 2019-12-15 | End: 2019-12-16

## 2019-12-15 RX ORDER — NICOTINE POLACRILEX 4 MG
15-30 LOZENGE BUCCAL
Status: DISCONTINUED | OUTPATIENT
Start: 2019-12-15 | End: 2019-12-16

## 2019-12-15 RX ORDER — POTASSIUM CHLORIDE 7.45 MG/ML
10 INJECTION INTRAVENOUS
Status: DISCONTINUED | OUTPATIENT
Start: 2019-12-15 | End: 2019-12-17 | Stop reason: HOSPADM

## 2019-12-15 RX ORDER — FOLIC ACID 1 MG/1
1 TABLET ORAL DAILY
Status: DISCONTINUED | OUTPATIENT
Start: 2019-12-16 | End: 2019-12-17 | Stop reason: HOSPADM

## 2019-12-15 RX ORDER — POTASSIUM CL/LIDO/0.9 % NACL 10MEQ/0.1L
10 INTRAVENOUS SOLUTION, PIGGYBACK (ML) INTRAVENOUS
Status: DISCONTINUED | OUTPATIENT
Start: 2019-12-15 | End: 2019-12-17 | Stop reason: HOSPADM

## 2019-12-15 RX ORDER — MAGNESIUM SULFATE HEPTAHYDRATE 40 MG/ML
4 INJECTION, SOLUTION INTRAVENOUS EVERY 4 HOURS PRN
Status: DISCONTINUED | OUTPATIENT
Start: 2019-12-15 | End: 2019-12-17 | Stop reason: HOSPADM

## 2019-12-15 RX ORDER — LORAZEPAM 1 MG/1
1-2 TABLET ORAL EVERY 30 MIN PRN
Status: DISCONTINUED | OUTPATIENT
Start: 2019-12-15 | End: 2019-12-17 | Stop reason: HOSPADM

## 2019-12-15 RX ORDER — INSULIN LISPRO 100 [IU]/ML
1-10 INJECTION, SOLUTION INTRAVENOUS; SUBCUTANEOUS
COMMUNITY

## 2019-12-15 RX ORDER — MULTIPLE VITAMINS W/ MINERALS TAB 9MG-400MCG
1 TAB ORAL DAILY
Status: DISCONTINUED | OUTPATIENT
Start: 2019-12-16 | End: 2019-12-17 | Stop reason: HOSPADM

## 2019-12-15 RX ORDER — ACETAMINOPHEN 325 MG/1
650 TABLET ORAL EVERY 4 HOURS PRN
Status: DISCONTINUED | OUTPATIENT
Start: 2019-12-15 | End: 2019-12-17 | Stop reason: HOSPADM

## 2019-12-15 RX ORDER — AMOXICILLIN 250 MG
2 CAPSULE ORAL 2 TIMES DAILY PRN
Status: DISCONTINUED | OUTPATIENT
Start: 2019-12-15 | End: 2019-12-17 | Stop reason: HOSPADM

## 2019-12-15 RX ORDER — LANOLIN ALCOHOL/MO/W.PET/CERES
100 CREAM (GRAM) TOPICAL DAILY
Status: DISCONTINUED | OUTPATIENT
Start: 2019-12-16 | End: 2019-12-17 | Stop reason: HOSPADM

## 2019-12-15 RX ORDER — ONDANSETRON 4 MG/1
4 TABLET, ORALLY DISINTEGRATING ORAL EVERY 6 HOURS PRN
Status: DISCONTINUED | OUTPATIENT
Start: 2019-12-15 | End: 2019-12-17 | Stop reason: HOSPADM

## 2019-12-15 RX ORDER — IBUPROFEN 600 MG/1
600 TABLET, FILM COATED ORAL ONCE
Status: COMPLETED | OUTPATIENT
Start: 2019-12-15 | End: 2019-12-15

## 2019-12-15 RX ORDER — POTASSIUM CHLORIDE 1500 MG/1
20-40 TABLET, EXTENDED RELEASE ORAL
Status: DISCONTINUED | OUTPATIENT
Start: 2019-12-15 | End: 2019-12-17 | Stop reason: HOSPADM

## 2019-12-15 RX ORDER — ONDANSETRON 2 MG/ML
4 INJECTION INTRAMUSCULAR; INTRAVENOUS EVERY 30 MIN PRN
Status: DISCONTINUED | OUTPATIENT
Start: 2019-12-15 | End: 2019-12-15

## 2019-12-15 RX ORDER — AMOXICILLIN 250 MG
1 CAPSULE ORAL 2 TIMES DAILY PRN
Status: DISCONTINUED | OUTPATIENT
Start: 2019-12-15 | End: 2019-12-17 | Stop reason: HOSPADM

## 2019-12-15 RX ORDER — MULTIPLE VITAMINS W/ MINERALS TAB 9MG-400MCG
1 TAB ORAL DAILY
COMMUNITY

## 2019-12-15 RX ORDER — POTASSIUM CHLORIDE 29.8 MG/ML
20 INJECTION INTRAVENOUS
Status: DISCONTINUED | OUTPATIENT
Start: 2019-12-15 | End: 2019-12-17 | Stop reason: HOSPADM

## 2019-12-15 RX ADMIN — SODIUM CHLORIDE 1000 ML: 9 INJECTION, SOLUTION INTRAVENOUS at 19:48

## 2019-12-15 RX ADMIN — Medication 2 UNITS/HR: at 20:43

## 2019-12-15 RX ADMIN — DEXTROSE AND SODIUM CHLORIDE 1000 ML: 5; 450 INJECTION, SOLUTION INTRAVENOUS at 21:33

## 2019-12-15 RX ADMIN — PANTOPRAZOLE SODIUM 40 MG: 40 INJECTION, POWDER, FOR SOLUTION INTRAVENOUS at 22:45

## 2019-12-15 RX ADMIN — ONDANSETRON HYDROCHLORIDE 4 MG: 2 INJECTION, SOLUTION INTRAMUSCULAR; INTRAVENOUS at 19:48

## 2019-12-15 RX ADMIN — IBUPROFEN 600 MG: 600 TABLET, FILM COATED ORAL at 21:06

## 2019-12-15 RX ADMIN — FOLIC ACID: 5 INJECTION, SOLUTION INTRAMUSCULAR; INTRAVENOUS; SUBCUTANEOUS at 22:36

## 2019-12-15 RX ADMIN — DEXTROSE AND SODIUM CHLORIDE: 5; 900 INJECTION, SOLUTION INTRAVENOUS at 22:39

## 2019-12-15 ASSESSMENT — ENCOUNTER SYMPTOMS
VOMITING: 1
DIARRHEA: 0
FEVER: 0

## 2019-12-15 NOTE — LETTER
Transition Communication Hand-off for Care Transitions to Next Level of Care Provider    Name: Awilda Whitaker  : 1995  MRN #: [id number]  Primary Care Provider: [facility]     Primary Clinic: [address] [place] [address]  Primary Care Clinic Name: (needs to establish care)  Reason for Hospitalization:  Diabetic ketoacidosis without coma associated with type 1 diabetes mellitus (H) [E10.10]  Admit Date/Time: 12/15/2019  7:28 PM  Discharge Date: 19  Payor Source: Payor: BCBS / Plan: BCBS OUT OF STATE / Product Type: Indemnity /     Readmission Assessment Measure (BLADIMIR) Risk Score/category: Low           Reason for Communication Hand-off Referral: Fragility    Discharge Plan: Home       Concern for non-adherence with plan of care:   Yes  Discharge Needs Assessment:  Needs      Most Recent Value   Anticipated Changes Related to Illness  none   # of Referrals Placed by CTS  Scheduled Follow-up appointments          Already enrolled in Tele-monitoring program and name of program:  NA  Follow-up specialty is recommended: Yes    Follow-up plan:    Future Appointments   Date Time Provider Department Center   2019  8:40 AM [facility]       Any outstanding tests or procedures:             Key Recommendations:  RN CTS following d/t DKA and no PCP listed in patient chart. Patient admitted with c/o nausea & vomiting found to have DKA. Patient is a daily alcohol drinker and has been seen by CD while in hospital.   Patient follows closely with endocrine, Dr. Long, with [facility], for care. Explained importance of also being established with primary medicine for additional medical needs. Patient understanding and agreeable. Patient expressed interest in obtaining a glucagon kit. Patient currently obtains insulin from [facility] as this is best for current insurance coverage. Patient states will be transitioning to own insurance with a high deductible and is  concerned about insulin costs moving forward. Pharmacy liaison consult placed to review current insulin costs and for possible available discounts. Encouraged patient to continue discussion with provider and outpatient pharmacy for insulin options.   Patient has a glucometer at home. Patient does carb counting. Patient states is decreasing amount of fast food intake and is cooking more at home.     Patient will establish care at [facility].   Please follow up with patient for CD follow up, diabetes management, and medication resources. Patient was discharged home on gabapentin.     Karina Dos Santos MA/RN Case Manager  Inpatient Care Coordination  [facility]   [phone]    AVS/Discharge Summary is the source of truth; this is a helpful guide for improved communication of patient story

## 2019-12-16 LAB
ALBUMIN SERPL-MCNC: 3.4 G/DL (ref 3.4–5)
ALP SERPL-CCNC: 90 U/L (ref 40–150)
ALT SERPL W P-5'-P-CCNC: 33 U/L (ref 0–70)
ANION GAP SERPL CALCULATED.3IONS-SCNC: 10 MMOL/L (ref 3–14)
ANION GAP SERPL CALCULATED.3IONS-SCNC: 10 MMOL/L (ref 3–14)
ANION GAP SERPL CALCULATED.3IONS-SCNC: 9 MMOL/L (ref 3–14)
AST SERPL W P-5'-P-CCNC: 17 U/L (ref 0–45)
BILIRUB DIRECT SERPL-MCNC: 0.2 MG/DL (ref 0–0.2)
BILIRUB SERPL-MCNC: 1.1 MG/DL (ref 0.2–1.3)
BUN SERPL-MCNC: 13 MG/DL (ref 7–30)
BUN SERPL-MCNC: 13 MG/DL (ref 7–30)
BUN SERPL-MCNC: 14 MG/DL (ref 7–30)
CALCIUM SERPL-MCNC: 8 MG/DL (ref 8.5–10.1)
CHLORIDE SERPL-SCNC: 107 MMOL/L (ref 94–109)
CHLORIDE SERPL-SCNC: 109 MMOL/L (ref 94–109)
CHLORIDE SERPL-SCNC: 110 MMOL/L (ref 94–109)
CO2 SERPL-SCNC: 18 MMOL/L (ref 20–32)
CO2 SERPL-SCNC: 18 MMOL/L (ref 20–32)
CO2 SERPL-SCNC: 19 MMOL/L (ref 20–32)
CREAT SERPL-MCNC: 0.65 MG/DL (ref 0.66–1.25)
CREAT SERPL-MCNC: 0.66 MG/DL (ref 0.66–1.25)
CREAT SERPL-MCNC: 0.67 MG/DL (ref 0.66–1.25)
GFR SERPL CREATININE-BSD FRML MDRD: >90 ML/MIN/{1.73_M2}
GLUCOSE BLDC GLUCOMTR-MCNC: 135 MG/DL (ref 70–99)
GLUCOSE BLDC GLUCOMTR-MCNC: 138 MG/DL (ref 70–99)
GLUCOSE BLDC GLUCOMTR-MCNC: 139 MG/DL (ref 70–99)
GLUCOSE BLDC GLUCOMTR-MCNC: 142 MG/DL (ref 70–99)
GLUCOSE BLDC GLUCOMTR-MCNC: 143 MG/DL (ref 70–99)
GLUCOSE BLDC GLUCOMTR-MCNC: 150 MG/DL (ref 70–99)
GLUCOSE BLDC GLUCOMTR-MCNC: 152 MG/DL (ref 70–99)
GLUCOSE BLDC GLUCOMTR-MCNC: 154 MG/DL (ref 70–99)
GLUCOSE BLDC GLUCOMTR-MCNC: 155 MG/DL (ref 70–99)
GLUCOSE BLDC GLUCOMTR-MCNC: 156 MG/DL (ref 70–99)
GLUCOSE BLDC GLUCOMTR-MCNC: 156 MG/DL (ref 70–99)
GLUCOSE BLDC GLUCOMTR-MCNC: 157 MG/DL (ref 70–99)
GLUCOSE BLDC GLUCOMTR-MCNC: 160 MG/DL (ref 70–99)
GLUCOSE BLDC GLUCOMTR-MCNC: 161 MG/DL (ref 70–99)
GLUCOSE BLDC GLUCOMTR-MCNC: 161 MG/DL (ref 70–99)
GLUCOSE BLDC GLUCOMTR-MCNC: 171 MG/DL (ref 70–99)
GLUCOSE BLDC GLUCOMTR-MCNC: 172 MG/DL (ref 70–99)
GLUCOSE BLDC GLUCOMTR-MCNC: 186 MG/DL (ref 70–99)
GLUCOSE BLDC GLUCOMTR-MCNC: 187 MG/DL (ref 70–99)
GLUCOSE BLDC GLUCOMTR-MCNC: 194 MG/DL (ref 70–99)
GLUCOSE BLDC GLUCOMTR-MCNC: 203 MG/DL (ref 70–99)
GLUCOSE BLDC GLUCOMTR-MCNC: 234 MG/DL (ref 70–99)
GLUCOSE SERPL-MCNC: 160 MG/DL (ref 70–99)
GLUCOSE SERPL-MCNC: 163 MG/DL (ref 70–99)
GLUCOSE SERPL-MCNC: 211 MG/DL (ref 70–99)
INTERPRETATION ECG - MUSE: NORMAL
KETONES BLD-SCNC: 1.6 MMOL/L (ref 0–0.6)
MRSA DNA SPEC QL NAA+PROBE: NEGATIVE
PHOSPHATE SERPL-MCNC: 2.3 MG/DL (ref 2.5–4.5)
POTASSIUM SERPL-SCNC: 3.8 MMOL/L (ref 3.4–5.3)
POTASSIUM SERPL-SCNC: 3.8 MMOL/L (ref 3.4–5.3)
POTASSIUM SERPL-SCNC: 4 MMOL/L (ref 3.4–5.3)
PROT SERPL-MCNC: 6 G/DL (ref 6.8–8.8)
SODIUM SERPL-SCNC: 135 MMOL/L (ref 133–144)
SODIUM SERPL-SCNC: 137 MMOL/L (ref 133–144)
SODIUM SERPL-SCNC: 138 MMOL/L (ref 133–144)
SPECIMEN SOURCE: NORMAL

## 2019-12-16 PROCEDURE — 80076 HEPATIC FUNCTION PANEL: CPT | Performed by: INTERNAL MEDICINE

## 2019-12-16 PROCEDURE — 20000003 ZZH R&B ICU

## 2019-12-16 PROCEDURE — 00000146 ZZHCL STATISTIC GLUCOSE BY METER IP

## 2019-12-16 PROCEDURE — 36415 COLL VENOUS BLD VENIPUNCTURE: CPT | Performed by: INTERNAL MEDICINE

## 2019-12-16 PROCEDURE — 82010 KETONE BODYS QUAN: CPT | Performed by: INTERNAL MEDICINE

## 2019-12-16 PROCEDURE — 80048 BASIC METABOLIC PNL TOTAL CA: CPT | Performed by: INTERNAL MEDICINE

## 2019-12-16 PROCEDURE — 40000007 ZZH STATISTIC ADULT CD FACE TO FACE-NO CHRG

## 2019-12-16 PROCEDURE — C9113 INJ PANTOPRAZOLE SODIUM, VIA: HCPCS | Performed by: INTERNAL MEDICINE

## 2019-12-16 PROCEDURE — 25000131 ZZH RX MED GY IP 250 OP 636 PS 637: Performed by: INTERNAL MEDICINE

## 2019-12-16 PROCEDURE — 25800030 ZZH RX IP 258 OP 636: Performed by: INTERNAL MEDICINE

## 2019-12-16 PROCEDURE — 25000128 H RX IP 250 OP 636: Performed by: INTERNAL MEDICINE

## 2019-12-16 PROCEDURE — 25000125 ZZHC RX 250: Performed by: INTERNAL MEDICINE

## 2019-12-16 PROCEDURE — 25000132 ZZH RX MED GY IP 250 OP 250 PS 637: Performed by: SURGERY

## 2019-12-16 PROCEDURE — 25000132 ZZH RX MED GY IP 250 OP 250 PS 637: Performed by: INTERNAL MEDICINE

## 2019-12-16 PROCEDURE — 84100 ASSAY OF PHOSPHORUS: CPT | Performed by: INTERNAL MEDICINE

## 2019-12-16 PROCEDURE — 99233 SBSQ HOSP IP/OBS HIGH 50: CPT | Performed by: INTERNAL MEDICINE

## 2019-12-16 RX ORDER — NICOTINE 21 MG/24HR
1 PATCH, TRANSDERMAL 24 HOURS TRANSDERMAL DAILY
Status: DISCONTINUED | OUTPATIENT
Start: 2019-12-16 | End: 2019-12-17 | Stop reason: HOSPADM

## 2019-12-16 RX ORDER — DEXTROSE MONOHYDRATE 25 G/50ML
25-50 INJECTION, SOLUTION INTRAVENOUS
Status: DISCONTINUED | OUTPATIENT
Start: 2019-12-16 | End: 2019-12-17 | Stop reason: HOSPADM

## 2019-12-16 RX ORDER — NICOTINE POLACRILEX 4 MG
15-30 LOZENGE BUCCAL
Status: DISCONTINUED | OUTPATIENT
Start: 2019-12-16 | End: 2019-12-17 | Stop reason: HOSPADM

## 2019-12-16 RX ORDER — TRAZODONE HYDROCHLORIDE 50 MG/1
50 TABLET, FILM COATED ORAL AT BEDTIME
Status: DISCONTINUED | OUTPATIENT
Start: 2019-12-16 | End: 2019-12-17 | Stop reason: HOSPADM

## 2019-12-16 RX ADMIN — Medication: at 13:15

## 2019-12-16 RX ADMIN — ACETAMINOPHEN 650 MG: 325 TABLET, FILM COATED ORAL at 19:56

## 2019-12-16 RX ADMIN — DEXTROSE AND SODIUM CHLORIDE: 5; 900 INJECTION, SOLUTION INTRAVENOUS at 14:20

## 2019-12-16 RX ADMIN — POTASSIUM CHLORIDE 20 MEQ: 1500 TABLET, EXTENDED RELEASE ORAL at 09:42

## 2019-12-16 RX ADMIN — FOLIC ACID 1 MG: 1 TABLET ORAL at 08:06

## 2019-12-16 RX ADMIN — PANTOPRAZOLE SODIUM 40 MG: 40 INJECTION, POWDER, FOR SOLUTION INTRAVENOUS at 08:06

## 2019-12-16 RX ADMIN — NICOTINE 1 PATCH: 21 PATCH, EXTENDED RELEASE TRANSDERMAL at 02:43

## 2019-12-16 RX ADMIN — DEXTROSE AND SODIUM CHLORIDE: 5; 900 INJECTION, SOLUTION INTRAVENOUS at 06:16

## 2019-12-16 RX ADMIN — SODIUM PHOSPHATE, MONOBASIC, MONOHYDRATE 15 MMOL: 276; 142 INJECTION, SOLUTION INTRAVENOUS at 03:56

## 2019-12-16 RX ADMIN — MULTIPLE VITAMINS W/ MINERALS TAB 1 TABLET: TAB at 08:06

## 2019-12-16 RX ADMIN — NICOTINE 1 PATCH: 21 PATCH, EXTENDED RELEASE TRANSDERMAL at 08:05

## 2019-12-16 RX ADMIN — INSULIN DETEMIR 25 UNITS: 100 INJECTION, SOLUTION SUBCUTANEOUS at 22:02

## 2019-12-16 RX ADMIN — Medication 100 MG: at 08:06

## 2019-12-16 ASSESSMENT — ACTIVITIES OF DAILY LIVING (ADL)
ADLS_ACUITY_SCORE: 10
ADLS_ACUITY_SCORE: 12
ADLS_ACUITY_SCORE: 10

## 2019-12-16 ASSESSMENT — MIFFLIN-ST. JEOR: SCORE: 1873.63

## 2019-12-16 NOTE — ED NOTES
[facility]  ED Nurse Handoff Report    Awilda Whitaker is a 24 year old male   ED Chief complaint: Blood Sugar Problem  . ED Diagnosis:   Final diagnoses:   Diabetic ketoacidosis without coma associated with type 1 diabetes mellitus (H)     Allergies: No Known Allergies    Code Status: Full Code  Activity level - Baseline/Home:  Independent. Activity Level - Current:   Independent. Lift room needed: No. Bariatric: No   Needed: No   Isolation: No. Infection: Not Applicable.     Vital Signs:   Vitals:    12/15/19 1945 12/15/19 2000 12/15/19 2015 12/15/19 2100   BP: (!) 142/88 (!) 140/84  (!) 125/93   Pulse: 95 103 102 103   Resp: 21 19 28 18   Temp:       TempSrc:       SpO2: 100% 100% 100% 100%       Cardiac Rhythm:  ,      Pain level: 0-10 Pain Scale: 8  Patient confused: No. Patient Falls Risk: No.   Elimination Status: Has voided   Patient Report - Initial Complaint: Vomiting and high blood sugar. Focused Assessment: Pt slightly tremulous and diaphoretic. He states he laid in bed for 1 day due to vomiting and feeling sick. Upon further discussion he reports being a daily drinker with no history of withdrawal.  Tests Performed: blood, UA. Abnormal Results:   Labs Ordered and Resulted from Time of ED Arrival Up to the Time of Departure from the ED   HEMOGLOBIN A1C - Abnormal; Notable for the following components:       Result Value    Hemoglobin A1C 7.1 (*)     All other components within normal limits   CBC WITH PLATELETS DIFFERENTIAL - Abnormal; Notable for the following components:    WBC 12.9 (*)     Absolute Neutrophil 10.9 (*)     All other components within normal limits   COMPREHENSIVE METABOLIC PANEL - Abnormal; Notable for the following components:    Sodium 130 (*)     Potassium 5.6 (*)     Carbon Dioxide 10 (*)     Anion Gap 21 (*)     Glucose 256 (*)     All other components within normal limits   LIPASE - Abnormal; Notable for the following components:    Lipase 40 (*)     All  other components within normal limits   GLUCOSE BY METER - Abnormal; Notable for the following components:    Glucose 255 (*)     All other components within normal limits   ALCOHOL ETHYL - Abnormal; Notable for the following components:    Ethanol g/dL 0.02 (*)     All other components within normal limits   LACTIC ACID WHOLE BLOOD - Abnormal; Notable for the following components:    Lactic Acid 4.1 (*)     All other components within normal limits   GLUCOSE BY METER - Abnormal; Notable for the following components:    Glucose 210 (*)     All other components within normal limits   ISTAT BASIC MET ICA HCT POCT - Abnormal; Notable for the following components:    Sodium 131 (*)     Potassium 5.5 (*)     Total CO2 12 (*)     Glucose 270 (*)     Hemoglobin 18.0 (*)     All other components within normal limits   ISTAT  GASES LACTATE SORAIDA POCT - Abnormal; Notable for the following components:    Ph Venous 7.11 (*)     PCO2 Venous 35 (*)     Bicarbonate Venous 11 (*)     Lactic Acid 4.0 (*)     All other components within normal limits   GLUCOSE MONITOR NURSING POCT   ROUTINE UA WITH MICROSCOPIC   CARDIAC CONTINUOUS MONITORING   NOTIFY PHYSICIAN   PERIPHERAL IV CATHETER   IV ACCESS     .   Treatments provided: 2 liter bolus, insulin drip started, Ibuprofen for HA  Family Comments: Family at bedside, they were not present for discussion of etoh  OBS brochure/video discussed/provided to patient:  N/A  ED Medications:   Medications   insulin 1 unit/1 mL in NS (NovoLIN, HumuLIN Regular) drip -ED DKA algorithm (2 Units/hr Intravenous New Bag 12/15/19 2043)   ondansetron (ZOFRAN) injection 4 mg (4 mg Intravenous Given 12/15/19 1948)   dextrose 5% and 0.45% NaCl infusion (has no administration in time range)   0.9% sodium chloride BOLUS (0 mLs Intravenous Stopped 12/15/19 2106)   0.9% sodium chloride BOLUS (0 mLs Intravenous Stopped 12/15/19 2044)   ibuprofen (ADVIL/MOTRIN) tablet 600 mg (600 mg Oral Given 12/15/19 2106)      Drips infusing:  Yes  For the majority of the shift, the patient's behavior Green. Interventions performed were NA.     Severe Sepsis OR Septic Shock Diagnosis Present: No      ED Nurse Name/Phone Number: Rima Flores RN,   9:19 PM

## 2019-12-16 NOTE — H&P
[facility]  Hospitalist Admission Note  Name: Awilda Whitaker    MRN: [id number]  YOB: 1995    Age: 24 year old  Date of admission: 12/15/2019  Primary care provider: No Ref-Primary, Physician    Chief Complaint:  DKA    Awilda Whitaker is a 24 year old male with PMH including type 1 diabetes diagnosed at age 6, daily alcohol use who presents with nausea, vomiting and malaise.  He vomited about 8 times today at home, all of which were nonbloody.  He has a ketosis test at home which he checked and was positive.  Blood sugar was up to 300 this morning.  He notes he has been able to drink water but really not any food.  He does admit to normally drinking about 4 vodka drinks per day but recently alludes to the idea that he has been drinking more due to social stresses.      Here in the ER he was noted to be acidotic with pH of 7.11 and bicarbonate level of 11.  Lactic acid was elevated at 4.1.  Lipase was negative.  Hemoglobin A1c was 7.1.  He was felt clinically to be in DKA and was given 2 L IV fluid and started on insulin drip.    Assessment and Plan:   1. Diabetic ketoacidosis: As above.  Possible triggers increased social stress with ongoing alcohol abuse.  Currently nontoxic-appearing and mentation is intact.  --Admit to the ICU  --Insulin drip  --D5 normal saline at 150 cc/h  --Trend ketones, lactic acid, kidney function and electrolytes overnight    2.   Type 1 diabetes: He tells me he was diagnosed at age 6.  Normally takes long-acting insulin 25 units at night and with meals takes 1 unit per 10 g carb.  Overall seems to have good control, recent events not withstanding.    3.   Alcohol abuse, risk for withdrawal: Currently does not appear to be in alcohol withdrawal though he is somewhat tachycardic which may be primarily due to #1 above.  That said, I do think he is at significant risk for withdrawal so we will have Ativan available as per [facility] protocol and also give IV then oral  vitamins.  --Anticipate CD consult in the next day or 2.    4.   Suspected gastritis versus esophagitis: We will empirically start IV Protonix.  Suspect alcohol related.  Denies melena.      DVT Prophylaxis: Pneumatic Compression Devices  Code Status: Full Code  Discharge Dispo: Admit to inpatient status        History of Present Illness:  Awilda Whitaker is a 24 year old male with PMH including type 1 diabetes diagnosed at age 6, daily alcohol use who presents with nausea, vomiting and malaise.  He is accompanied by his mom who provides some of the history.  He vomited about 8 times today at home, all of which were nonbloody.  He has a ketosis test at home which he checked and was positive.  Blood sugar was up to 300 this morning.  He notes he has been able to drink water but really not any food.  Normally blood sugars are around the mid 100s.  He notes he is under some increased stress having recently gone through a break-up.  Normally he drinks about 4 vodka drinks per day but recently alludes to the idea that he has been drinking more.  He is interested in talking with a CD counselor or social work regarding possible outpatient treatment.  He denies ever having gone through alcohol withdrawal in the past but does notice that he sleeps poorly if he does not drink alcohol.    He denies any melena.  He has had some chest discomfort worse lying flat which he feels is severe acid reflux.    Here in the ER he was noted to be acidotic with pH of 7.11 and bicarbonate level of 11.  Lactic acid was elevated at 4.1.  Lipase was negative.  Hemoglobin A1c was 7.1.  He was felt clinically to be in DKA and was given 2 L IV fluid and started on insulin drip.     Past Medical History:  Past Medical History:   Diagnosis Date     DKA (diabetic ketoacidoses) (H)      Past Surgical History:  No known significant past surgical history    Social History:  See above regarding alcohol  Use.    Family History:  Family History   Problem  Relation Age of Onset     Coronary Artery Disease Maternal Grandmother         d at 68     Lung Cancer Maternal Grandfather      Coronary Artery Disease Paternal Grandfather         s/p CABG     Allergies:  No Known Allergies  Medications:  No current facility-administered medications on file prior to encounter.   aspirin (ASA) 325 MG EC tablet, Take 325 mg by mouth daily  insulin detemir (LEVEMIR PEN) 100 UNIT/ML pen, Inject 25 Units Subcutaneous At Bedtime  insulin lispro (HUMALOG PEN) 100 UNIT/ML (1 unit dial) pen, Inject 1-10 Units Subcutaneous 4 times daily (with meals and nightly) 1 unit / 10 carbs  multivitamin w/minerals (THERA-VIT-M) tablet, Take 1 tablet by mouth daily        Review of Systems:  A Comprehensive greater than 10 system review of systems was carried out.  Pertinent positives and negatives are noted above.  Otherwise negative for contributory information.     Physical Exam:  Blood pressure (!) 125/93, pulse 103, temperature 97.9  F (36.6  C), temperature source Temporal, resp. rate 18, SpO2 100 %.  Wt Readings from Last 1 Encounters:   05/16/17 80.2 kg (176 lb 14.4 oz)     Exam:  General: Alert, awake, no acute distress.  Slightly pale but nontoxic.  HEENT: NC/AT, eyes anicteric, external occular movements intact, face symmetric.  Dentition WNL, MM moist.  Cardiac: RRR, S1, S2.  No murmurs appreciated.  Pulmonary: Normal chest rise, normal work of breathing.  Lungs CTA BL  Abdomen: soft, non-tender, non-distended.  Bowel Sounds Present.  No guarding.  Extremities: no deformities.  Warm, well perfused.  Skin: no rashes or lesions noted.  Warm and Dry.  Neuro: No focal deficits noted.  Speech clear.  Coordination and strength grossly normal.  Psych: Appropriate affect.    Data:  EKG:  None.  Imaging:  No results found for this or any previous visit (from the past 48 hour(s)).  Labs:  Recent Labs   Lab 12/15/19  1945 12/15/19  1936   WBC  --  12.9*   HGB 18.0* 17.1   HCT  --  51.5   MCV  --  99    PLT  --  284          Lab Results   Component Value Date     12/15/2019     12/15/2019    Lab Results   Component Value Date    CHLORIDE 104 12/15/2019    CHLORIDE 99 12/15/2019    Lab Results   Component Value Date    BUN 18 12/15/2019    BUN 16 12/15/2019      Lab Results   Component Value Date    POTASSIUM 5.5 12/15/2019    POTASSIUM 5.6 12/15/2019    Lab Results   Component Value Date    CO2 10 12/15/2019    Lab Results   Component Value Date    CR 0.83 12/15/2019        Recent Labs   Lab 12/15/19  1936   AST 41   ALT 54   ALKPHOS 136   BILITOTAL 1.0     No results for input(s): INR in the last 168 hours.  Recent Labs   Lab 12/15/19  1940 12/15/19  1936   LACT 4.0* 4.1*         Slick Chicas MD  Hospitalist  [facility]

## 2019-12-16 NOTE — ED PROVIDER NOTES
History     Chief Complaint:  Blood Sugar Problem      HPI   Awilda Whitaker is a 24 year old male, with a history of type I diabetes, who presents with eight episodes of vomiting today in the context of positive ketosis test at home. This morning his glucose levels were above 300. He did take multiple units of insulin tonight. Last glucose was 236. Patient felt ill yesterday and stayed in bed and he did not eat much today. He had a couple of friends over two nights ago and drank a few alcoholic beverages. His friends were not ill. Patient reports he drinks about 4 vodka drinks daily, has never sought treatment or never tried to quit cold turkey. Last drink was 0100 today and he had a couple of shots of vodka at this time. His blood glucose is typically stable around 160. Patient has been in DKA once when was [age] years old. No recent travel. Patient denies fever and diarrhea.     Cardiac Risk Factors   Sex: Male   Tobacco: Negative   Hypertension: Negative  Diabetes: Positive  Hyperlipidemia: Negative  Family History: Negative    Allergies:  No known drug allergies.    Medications:    Aspirin 325 mg  Insulin aspart   Insulin glargine    Past Medical History:    DKA  DM type I  Renal lithiasis   Varicocele     Past Surgical History:    History reviewed. No pertinent past surgical history.    Family History:    CAD  Lung cancer    Social History:  Presents to the ED with his parents.   Tobacco Use: Former Smoker  Alcohol Use: 4 drinks per day  History of marijuana use.  PCP: Physician No Ref-Primary  Marital Status:  Single [1]     Review of Systems   Constitutional: Negative for fever.   Gastrointestinal: Positive for vomiting. Negative for diarrhea.   All other systems reviewed and are negative.        Physical Exam     Patient Vitals for the past 24 hrs:   BP Temp Temp src Pulse Heart Rate Resp SpO2   12/15/19 2100 (!) 125/93 -- -- 103 95 18 100 %   12/15/19 2015 -- -- -- 102 105 28 100 %   12/15/19 2000 (!) 140/84  -- -- 103 96 19 100 %   12/15/19 1945 (!) 142/88 -- -- 95 96 21 100 %   12/15/19 1927 (!) 165/104 97.9  F (36.6  C) Temporal -- 106 15 99 %       Physical Exam  Constitutional:       Appearance: He is well-developed. He is diaphoretic.   HENT:      Right Ear: Tympanic membrane and external ear normal.      Left Ear: Tympanic membrane and external ear normal.      Mouth/Throat:      Mouth: Mucous membranes are dry.      Pharynx: Oropharynx is clear. No oropharyngeal exudate.   Eyes:      General: No scleral icterus.     Extraocular Movements: Extraocular movements intact.      Conjunctiva/sclera: Conjunctivae normal.      Pupils: Pupils are equal, round, and reactive to light.   Neck:      Musculoskeletal: Normal range of motion and neck supple.   Cardiovascular:      Rate and Rhythm: Regular rhythm. Tachycardia present.      Pulses: Normal pulses.      Heart sounds: Normal heart sounds. No murmur. No friction rub. No gallop.    Pulmonary:      Effort: Pulmonary effort is normal. No respiratory distress.      Breath sounds: Normal breath sounds. No wheezing or rales.   Abdominal:      General: Bowel sounds are normal. There is no distension.      Palpations: Abdomen is soft. There is no mass.      Tenderness: There is no abdominal tenderness. There is no guarding or rebound.   Musculoskeletal: Normal range of motion.   Skin:     General: Skin is warm.      Findings: No rash.   Neurological:      General: No focal deficit present.      Mental Status: He is alert.           Emergency Department Course     ECG:  @ 2009  Indication: Hyperglycemia  Vent. Rate 99 bpm. WI interval 132 ms. QRS duration 86 ms. QT/QTc 354/454 ms. P-R-T axis 70 60 41.   Normal sinus rhythm. Normal ECG.   Read @ 2020 by Dr. Lamb.     Laboratory:  Laboratory findings were communicated with the patient who voiced understanding of the findings.    (1940) ISTAT gases lactate yumiko POCT: pH 7.11 (LL), pCO2 35 (L), pO2 34, HCO3 11 (L), Lactate 4.0  (HH)  (1945) ISTAT BMP:  (L), potassium 5.5 (H), CO2 12 (L), glucose 270 (H), creatinine 0.8, HGB 18.0 (H)  (1934) Glucose: 255 (H)  CBC:  WBC 12.9 (H), HGB 17.1, , otherwise WNL  CMP:  (L), potassium 5.6 (H), CO2 10 (LL), anion gap 21 (H), glucose 256 (H), otherwise WNL (Creatinine 0.83)  Lipase: 40 (L)  (2003) Lactic acid: 4.1 (HH)  Hemoglobin A1c: 7.1 (H)  Ethanol: 0.02 (H)  UA: Ordered    Interventions:1948 1948: Normal Saline, 1 liter, IV bolus   1948: Normal Saline, 1 liter, IV bolus   1948: Zofran, 4 mg, IV  2043: Insulin drip, 2 units/hr, IV   2106: Advil, 600 mg, oral    Emergency Department Course:  Past medical records, nursing notes, and vitals reviewed.    1935: I performed an exam of the patient as documented above.     EKG obtained in the ED, see results above.   IV was inserted and blood was drawn for laboratory testing, results above.  The patient provided a urine sample here in the emergency department. This was sent for laboratory testing, findings above.    2024: I rechecked the patient and discussed results.     Findings and plan explained to the Patient who consents to admission.   2037: Discussed the patient with Dr. Chicas, who will admit the patient to an ICU bed for further monitoring, evaluation, and treatment.    I personally reviewed the laboratory results with the Patient and answered all related questions prior to admission.     Impression & Plan     CMS Diagnoses: The Lactic acid level is elevated due to dehydration and DKA, at this time there is no sign of severe sepsis or septic shock.    Medical Decision Making:  Awilda Whitaker is a 24 year old male who presents with high blood sugar, ketones as well as vomiting. Patient appears to have been drinking heavily and has been doing that recently due to a break up. He denies any history of alcohol withdrawal but has never quit drinking cold turkey. I do think the heavy drinking has pushed him into DKA and is  likely the cause of today's symptoms. He was started on an insulin and will be monitored carefully in the ICU and will be placed on a withdrawal protocol as well.      Diagnosis:    ICD-10-CM    1. Diabetic ketoacidosis without coma associated with type 1 diabetes mellitus (H) E10.10 Glucose by meter     Glucose by meter       Disposition:  Admitted to the ICU.      Scribe Disclosure:  GISELA, Mellissa Cintron, am serving as a scribe at 7:35 PM on 12/15/2019 to document services personally performed by Elizabeth Lamb MD based on my observations and the provider's statements to me.     Elizabeth Lamb MD  12/15/19 [id number]

## 2019-12-16 NOTE — CONSULTS
Care Transition Initial Assessment - RN        Met with: Patient.  DATA   Active Problems:    DKA (diabetic ketoacidoses) (H)       Cognitive Status: awake, alert and oriented.  Primary Care Clinic Name: (needs to establish care)     Contact information and PCP information verified: Yes, has chosen to establish care with [facility].   Lives With: alone                     Insurance concerns: Insurance Plan concerned about insulin costs and coverage  ASSESSMENT  Patient currently receives the following services:  None        Identified issues/concerns regarding health management: RN CTS following d/t DKA and no PCP listed in patient chart. Patient admitted with c/o nausea & vomiting found to have DKA. Patient is a daily alcohol drinker and has been seen by CD while in hospital.   Patient follows closely with endocrine, Dr. Long, with [facility], for care. Explained importance of also being established with primary medicine for additional medical needs. Patient understanding and agreeable. Patient expressed interest in obtaining a glucagon kit. Patient currently obtains insulin from [facility] as this is best for current insurance coverage. Patient states will be transitioning to own insurance with a high deductible and is concerned about insulin costs moving forward. Pharmacy liaison consult placed to review current insulin costs and for possible available discounts. Encouraged patient to continue discussion with provider and outpatient pharmacy for insulin options.   Patient has a glucometer at home. Patient does carb counting. Patient states is decreasing amount of fast food intake and is cooking more at home.   Handoff will be given to [facility] clinic care coordinator for further follow up.   PLAN  Financial costs for the patient include none .  Patient given options and choices for discharge Yes .  Patient/family is agreeable to the plan?  Yes: anxious for discharge  when medically ready.   Patient anticipates discharging to Home .        Patient anticipates needs for home equipment: No  Transportation/person available to transport on day of discharge  is TBD.   Plan/Disposition: Home   Appointments: Scheduled PCP appointment to establish care and updated to AVS.       Dec31 Office Visit with Rema Mcfadden CNP   Tuesday Dec 31, 2019 8:40 AM        Care  (CTS) will continue to follow as needed.    Karina Dos Santos MA/RN Case Manager  Inpatient Care Coordination  [facility]   [phone]

## 2019-12-16 NOTE — PHARMACY-ADMISSION MEDICATION HISTORY
"Admission medication history interview status for this patient is complete. See [facility] admission navigator for allergy information, prior to admission medications and immunization status.     Medication history interview source(s):Patient  Medication history resources (including written lists, pill bottles, clinic record): CareEverywhere      Changes made to PTA medication list:  Added: mvi  Deleted: lisinopril  Changed: aspirin 81mg --> 325mg, aspart --> humalog, lantus --> basaglar    Actions taken by pharmacist (provider contacted, etc):None     Additional medication history information:None    Medication reconciliation/reorder completed by provider prior to medication history?  No     Do you take OTC medications (eg tylenol, ibuprofen, fish oil, eye/ear drops, etc)? No     For patients on insulin therapy: Yes  Lantus/levemir/NPH/toujeo/tresiba/Mix 70/30 dose:  Yes  Sliding scale Novolog: No  If Yes, do you have a baseline novolog pre-meal dose:  1 unit/10 carbs  Patients eat three meals a day: Yes  How many episodes of hypoglycemia do you have per week: ~2 - usually overnight \"took too much humalog before bed\". Does state he always takes humalog with food, even at bedtime  How many missed doses do you have per week: 0  How many times do you check your blood glucose per day:  5  Do you have a Continuous glucose monitor (CGM) : No (remind pt that not approved for hospital use)  Any Barriers to therapy - Be specific :  No  (cost of medications, comfortable with giving injections (if applicable), comfortable and confident with current diabetes regimen)      Prior to Admission medications    Medication Sig Last Dose Taking? Auth Provider   aspirin (ASA) 325 MG EC tablet Take 325 mg by mouth daily 12/15/2019 at am Yes Unknown, Entered By History   insulin detemir (LEVEMIR PEN) 100 UNIT/ML pen Inject 25 Units Subcutaneous At Bedtime 12/14/2019 at pm Yes Unknown, Entered By History   insulin lispro (HUMALOG PEN) 100 " UNIT/ML (1 unit dial) pen Inject 1-10 Units Subcutaneous 4 times daily (with meals and nightly) 1 unit / 10 carbs 12/15/2019 at ~20 units PTA Yes Unknown, Entered By History   multivitamin w/minerals (THERA-VIT-M) tablet Take 1 tablet by mouth daily 12/14/2019 at Unknown time Yes Unknown, Entered By History

## 2019-12-16 NOTE — PLAN OF CARE
ICU End of Shift Summary.  For vital signs and complete assessments, please see documentation flowsheets.     Pertinent assessments: All VSS, LS clear and satting well on RA.  Tele SR, no complaints of pain or nausea, A&O x4, up with SBA.  Vitamin bag, insulin gtt and IVF infusing.  Diet advanced per patient request, tolerated clear liquids.  Major Shift Events: Replaced K phos per protocol, recheck at 1000.  Plan (Upcoming Events): Continue to monitor blood glucose, electrolytes and other labs  Discharge/Transfer Needs: TBD    Bedside Shift Report Completed : Yes  Bedside Safety Check Completed: Yes

## 2019-12-16 NOTE — ED TRIAGE NOTES
Pt states hx of Type 1 diabetes. States had positive ketosis tests at home. Last BSG at home 236. Admits to vomiting earlier today. ABCs intact GCS 15

## 2019-12-16 NOTE — CONSULTS
"12/16/19 chem dep consult completed.      Met with patient, and his father and step-mother were present, whom he gave verbal consent to stay during interview.  Patient acknowledged that his drinking has increased over the last year following a break up. He recognizes a need to cut down but is unsure about abstinence.  Father expressed increasing concerns regarding patients drinking.  Patient said he is interested in \"talking to someone\" but not interested in AA or type support.   He was open to me emailing him some resources.  I encouraged him to listen to the doctors concerns regarding his drinking and relationship with his health issues.  I suggested he talk inquire with him primary doctor about referral for individual therapy otherwise I emailed link for [employer] to find a therapist in his area.  He is aware he can contact me in the future with any questions.    Nadeen Pierce [facility] [place]  [phone]  "

## 2019-12-17 VITALS
RESPIRATION RATE: 14 BRPM | TEMPERATURE: 97.6 F | WEIGHT: 177.03 LBS | HEART RATE: 74 BPM | SYSTOLIC BLOOD PRESSURE: 133 MMHG | DIASTOLIC BLOOD PRESSURE: 97 MMHG | HEIGHT: 75 IN | OXYGEN SATURATION: 100 % | BODY MASS INDEX: 22.01 KG/M2

## 2019-12-17 PROBLEM — E10.10 DIABETIC KETOACIDOSIS WITHOUT COMA ASSOCIATED WITH TYPE 1 DIABETES MELLITUS (H): Status: ACTIVE | Noted: 2019-12-15

## 2019-12-17 PROBLEM — F10.10 ALCOHOL ABUSE: Status: ACTIVE | Noted: 2019-12-17

## 2019-12-17 LAB
ALBUMIN SERPL-MCNC: 3.3 G/DL (ref 3.4–5)
ALP SERPL-CCNC: 84 U/L (ref 40–150)
ALT SERPL W P-5'-P-CCNC: 42 U/L (ref 0–70)
ANION GAP SERPL CALCULATED.3IONS-SCNC: 9 MMOL/L (ref 3–14)
AST SERPL W P-5'-P-CCNC: 48 U/L (ref 0–45)
BILIRUB SERPL-MCNC: 0.6 MG/DL (ref 0.2–1.3)
BUN SERPL-MCNC: 10 MG/DL (ref 7–30)
CALCIUM SERPL-MCNC: 8.4 MG/DL (ref 8.5–10.1)
CHLORIDE SERPL-SCNC: 110 MMOL/L (ref 94–109)
CO2 SERPL-SCNC: 23 MMOL/L (ref 20–32)
CREAT SERPL-MCNC: 0.63 MG/DL (ref 0.66–1.25)
GFR SERPL CREATININE-BSD FRML MDRD: >90 ML/MIN/{1.73_M2}
GLUCOSE BLDC GLUCOMTR-MCNC: 128 MG/DL (ref 70–99)
GLUCOSE BLDC GLUCOMTR-MCNC: 171 MG/DL (ref 70–99)
GLUCOSE BLDC GLUCOMTR-MCNC: 236 MG/DL (ref 70–99)
GLUCOSE BLDC GLUCOMTR-MCNC: 73 MG/DL (ref 70–99)
GLUCOSE SERPL-MCNC: 123 MG/DL (ref 70–99)
MAGNESIUM SERPL-MCNC: 1.7 MG/DL (ref 1.6–2.3)
PHOSPHATE SERPL-MCNC: 3 MG/DL (ref 2.5–4.5)
POTASSIUM SERPL-SCNC: 3.3 MMOL/L (ref 3.4–5.3)
PROT SERPL-MCNC: 5.7 G/DL (ref 6.8–8.8)
SODIUM SERPL-SCNC: 142 MMOL/L (ref 133–144)

## 2019-12-17 PROCEDURE — 25000132 ZZH RX MED GY IP 250 OP 250 PS 637: Performed by: INTERNAL MEDICINE

## 2019-12-17 PROCEDURE — 90686 IIV4 VACC NO PRSV 0.5 ML IM: CPT | Performed by: INTERNAL MEDICINE

## 2019-12-17 PROCEDURE — 83735 ASSAY OF MAGNESIUM: CPT | Performed by: INTERNAL MEDICINE

## 2019-12-17 PROCEDURE — 25000128 H RX IP 250 OP 636: Performed by: INTERNAL MEDICINE

## 2019-12-17 PROCEDURE — 25000132 ZZH RX MED GY IP 250 OP 250 PS 637: Performed by: SURGERY

## 2019-12-17 PROCEDURE — 80053 COMPREHEN METABOLIC PANEL: CPT | Performed by: INTERNAL MEDICINE

## 2019-12-17 PROCEDURE — 00000146 ZZHCL STATISTIC GLUCOSE BY METER IP

## 2019-12-17 PROCEDURE — 84100 ASSAY OF PHOSPHORUS: CPT | Performed by: INTERNAL MEDICINE

## 2019-12-17 PROCEDURE — 99239 HOSP IP/OBS DSCHRG MGMT >30: CPT | Performed by: INTERNAL MEDICINE

## 2019-12-17 PROCEDURE — 36415 COLL VENOUS BLD VENIPUNCTURE: CPT | Performed by: INTERNAL MEDICINE

## 2019-12-17 PROCEDURE — 80048 BASIC METABOLIC PNL TOTAL CA: CPT | Performed by: INTERNAL MEDICINE

## 2019-12-17 RX ORDER — GABAPENTIN 100 MG/1
200 CAPSULE ORAL 3 TIMES DAILY PRN
Qty: 30 CAPSULE | Refills: 0 | Status: SHIPPED | OUTPATIENT
Start: 2019-12-17

## 2019-12-17 RX ORDER — TRAZODONE HYDROCHLORIDE 50 MG/1
50 TABLET, FILM COATED ORAL AT BEDTIME
Qty: 30 TABLET | Refills: 1 | Status: SHIPPED | OUTPATIENT
Start: 2019-12-17

## 2019-12-17 RX ADMIN — Medication 100 MG: at 08:27

## 2019-12-17 RX ADMIN — FOLIC ACID 1 MG: 1 TABLET ORAL at 08:27

## 2019-12-17 RX ADMIN — NICOTINE 1 PATCH: 21 PATCH, EXTENDED RELEASE TRANSDERMAL at 08:27

## 2019-12-17 RX ADMIN — MULTIPLE VITAMINS W/ MINERALS TAB 1 TABLET: TAB at 08:27

## 2019-12-17 RX ADMIN — TRAZODONE HYDROCHLORIDE 50 MG: 50 TABLET ORAL at 01:25

## 2019-12-17 RX ADMIN — INFLUENZA A VIRUS A/BRISBANE/02/2018 IVR-190 (H1N1) ANTIGEN (FORMALDEHYDE INACTIVATED), INFLUENZA A VIRUS A/KANSAS/14/2017 X-327 (H3N2) ANTIGEN (FORMALDEHYDE INACTIVATED), INFLUENZA B VIRUS B/PHUKET/3073/2013 ANTIGEN (FORMALDEHYDE INACTIVATED), AND INFLUENZA B VIRUS B/MARYLAND/15/2016 BX-69A ANTIGEN (FORMALDEHYDE INACTIVATED) 0.5 ML: 15; 15; 15; 15 INJECTION, SUSPENSION INTRAMUSCULAR at 10:43

## 2019-12-17 ASSESSMENT — ACTIVITIES OF DAILY LIVING (ADL)
ADLS_ACUITY_SCORE: 10

## 2019-12-17 NOTE — PROGRESS NOTES
Pt discharged via family transport. AVS reviewed and questions answered. All pt belongings sent with pt at discharge

## 2019-12-17 NOTE — PHARMACY
Consult for insulin coverage.  According to note, patient will have high deductible plan in 2020.  I am making the assumption that this will still be through patient's employer, [employer].    Patient currently on Humalog and Levemir.  Levemir will not be covered by plan in 2020, unless a prior auth is obtained due to failure/contraindication to Lantus.         Rapid Acting  Humalog pen: $109 per pen.    Humalog vial: $280    Short Acting  Humulin R pen: $46 per pen  Humulin R vial: $150    Intermediate  Humulin N pens:  $95/pen  Humulin N vial:  $150  Novolin vial: Not covered by insurance, but can provide discount card to reduce this to $25/vial.  Humulin 70/30 pens:  $95/pen  Novolin 70/30 vial: Not covered by insurance, but can provide discount card to reduce this to $25/vial.  Novolin 70/30 pens: Not covered by insurance, but [facility] [place] pharmacy carries an exclusive Novolin (NPH) 70/30 pen product that is $43 for 5 pens.      Basal  Lantus pens: $87 per pen.  I can provide a copay savings card that reduces this to $99/month.      NAEL Healy, Pharmacy Technician/Liaison, Discharge Pharmacy *[phone]

## 2019-12-17 NOTE — PLAN OF CARE
ICU End of Shift Summary.  For vital signs and complete assessments, please see documentation flowsheets.     Pertinent assessments: All VSS, LS clear and satting well on RA.  Tele SR.  Up independently.  PRN Tylenol x1 for back pain with good relief.  Started Trazodone last night, tolerated well.  Major Shift Events: Stopped insulin gtt and IVF, transitioned patient to home insulin, last   Plan (Upcoming Events): Discharge home  Discharge/Transfer Needs: Plan to discharge to home today    Bedside Shift Report Completed : Yes  Bedside Safety Check Completed: Yes

## 2019-12-17 NOTE — PLAN OF CARE
ICU End of Shift Summary.  For vital signs and complete assessments, please see documentation flowsheets.     Pertinent assessments: A&Ox4, VSS, LS clear, BS audible - no BM this shift. Up SBA to bathroom, mod CHO diet. Tele - SR  Major Shift Events: sliding scale insulin and carb coverage started  Plan (Upcoming Events): Transition off insulin drip  Discharge/Transfer Needs: TBD    Bedside Shift Report Completed : Y  Bedside Safety Check Completed:Y

## 2019-12-17 NOTE — PROGRESS NOTES
"[facility]  Hospitalist Progress Note  Geoff Gardiner MD 12/16/2019    Reason for Stay (Diagnosis): DKA and Etoh abuse         Assessment and Plan:      Summary of Stay: Awilda Whitaker is a 24 year old male admitted on 12/15/2019 with DKA.     \"Awilda Whitaker is a 24 year old male with PMH including type 1 diabetes diagnosed at age 6, daily alcohol use who presents with nausea, vomiting and malaise.  He vomited about 8 times today at home, all of which were nonbloody.  He has a ketosis test at home which he checked and was positive.  Blood sugar was up to 300 this morning.  He notes he has been able to drink water but really not any food.  He does admit to normally drinking about 4 vodka drinks per day but recently alludes to the idea that he has been drinking more due to social stresses.       Here in the ER he was noted to be acidotic with pH of 7.11 and bicarbonate level of 11.  Lactic acid was elevated at 4.1.  Lipase was negative.  Hemoglobin A1c was 7.1.  He was felt clinically to be in DKA and was given 2 L IV fluid and started on insulin drip.\"    Overnight, Mr. Whitaker did well and was feeling substantially improved. He did not have significant problems with on-going nausea or vomiting, and as yet not had issues with Etoh withdrawal symptoms. He does endorse difficulty with insomnia for which he often drinks to intoxication. He also endorses losing relationships over Etoh abuse, but denies DWI, job losses.      His father and brother were there and report that they personally don't have problems with Etoh, but the patient's maternal grandfather was an alcoholic.     Problem List:   1. DKA, resolving.  Pt is now tolerating food and will be started on subcutaneous Insulin this evening.   2. Etoh abuse.  Concering that pt will have some problems with Etoh withdrawal, as he often gets a real urge to have a drink after a couple of days.     PLAN:  1.  Stop Insulin drip tonight. Start home insulin " "regimen.   2.  Will need to work with care coordinators and pharmacy to help pt with coverage for Insulin supplies, etc. He is looking at new insurance for this year, as he is turning 25.  3.  Chem Dep consult.   4.  Encouraged pt to try Trazodone for sleep. Could also add Gabapentin.   5.  Consider psych eval.     DVT Prophylaxis: Low Risk/Ambulatory with no VTE prophylaxis indicated  Code Status: Full Code  Discharge Dispo: home expected  Estimated Disch Date / # of Days until Disch: pending resolution of DKA and evolution of Etoh WD.        Interval History (Subjective):      Chart reviewed, pt interviewed.    As above.                   Physical Exam:      Last Vital Signs:  BP (!) 145/93 (BP Location: Right arm)   Pulse 82   Temp 98  F (36.7  C) (Oral)   Resp 14   Ht 1.905 m (6' 3\")   Wt 80.3 kg (177 lb 0.5 oz)   SpO2 100%   BMI 22.13 kg/m      I/O last 3 completed shifts:  In: 1866.26 [I.V.:1866.26]  Out: -     Constitutional: Awake, alert, cooperative, no apparent distress.  Pleasant and not at all tremulous.    Respiratory: Clear to auscultation bilaterally, no crackles or wheezing   Cardiovascular: Regular rate and rhythm, normal S1 and S2, and no murmur noted   Abdomen: Normal bowel sounds, soft, non-distended, non-tender   Skin: No rashes, no cyanosis, dry to touch   Neuro: Alert and oriented x3, no weakness, numbness, memory loss   Extremities: No edema.   Other(s):        All other systems: Negative          Medications:      All current medications were reviewed with changes reflected in problem list.         Data:      All new lab and imaging data was reviewed.   Labs/Imaging:  Results for orders placed or performed during the hospital encounter of 12/15/19 (from the past 24 hour(s))   Glucose by meter   Result Value Ref Range    Glucose 189 (H) 70 - 99 mg/dL   Methicillin Resistant Staph Aureus PCR   Result Value Ref Range    Specimen Description Nares     Methicillin Resist/Sens S. aureus PCR " "Negative NEG^Negative   Chemical Dependency IP Consult    Narrative    Rin Pierce, [facility]     12/16/2019 12:55 PM  12/16/19 chem dep consult completed.      Met with patient, and his father and step-mother were present,   whom he gave verbal consent to stay during interview.  Patient   acknowledged that his drinking has increased over the last year   following a break up. He recognizes a need to cut down but is   unsure about abstinence.  Father expressed increasing concerns   regarding patients drinking.  Patient said he is interested in   \"talking to someone\" but not interested in AA or type support.     He was open to me emailing him some resources.  I encouraged him   to listen to the doctors concerns regarding his drinking and   relationship with his health issues.  I suggested he talk inquire   with him primary doctor about referral for individual therapy   otherwise I emailed link for [employer] to find a therapist   in his area.  He is aware he can contact me in the future with   any questions.    Nadeen Pierce, [facility]  [phone]   Phosphorus   Result Value Ref Range    Phosphorus 2.3 (L) 2.5 - 4.5 mg/dL   Magnesium   Result Value Ref Range    Magnesium 2.0 1.6 - 2.3 mg/dL   Lactic acid whole blood   Result Value Ref Range    Lactic Acid 1.8 0.7 - 2.0 mmol/L   Blood gas venous with oxyhemoglobin   Result Value Ref Range    Ph Venous 7.25 (L) 7.32 - 7.43 pH    PCO2 Venous 31 (L) 40 - 50 mm Hg    PO2 Venous 53 (H) 25 - 47 mm Hg    Bicarbonate Venous 14 (L) 21 - 28 mmol/L    FIO2 room air      Oxyhemoglobin Venous 86 %    Base Deficit Venous 12.2 mmol/L   Ketone Beta-Hydroxybutyrate Quantitative   Result Value Ref Range    Ketone Quantitative 4.9 (HH) 0.0 - 0.6 mmol/L   Basic metabolic panel   Result Value Ref Range    Sodium 133 133 - 144 mmol/L    Potassium 4.6 3.4 - 5.3 mmol/L    Chloride 105 94 - 109 mmol/L    Carbon Dioxide 15 (L) 20 - 32 mmol/L    Anion Gap 13 3 - 14 mmol/L    Glucose " 183 (H) 70 - 99 mg/dL    Urea Nitrogen 14 7 - 30 mg/dL    Creatinine 0.67 0.66 - 1.25 mg/dL    GFR Estimate >90 >60 mL/min/[1.73_m2]    GFR Estimate If Black >90 >60 mL/min/[1.73_m2]    Calcium 8.2 (L) 8.5 - 10.1 mg/dL   Glucose by meter   Result Value Ref Range    Glucose 177 (H) 70 - 99 mg/dL   Glucose by meter   Result Value Ref Range    Glucose 152 (H) 70 - 99 mg/dL   Glucose by meter   Result Value Ref Range    Glucose 143 (H) 70 - 99 mg/dL   Glucose by meter   Result Value Ref Range    Glucose 156 (H) 70 - 99 mg/dL   Basic metabolic panel   Result Value Ref Range    Sodium 135 133 - 144 mmol/L    Potassium 4.0 3.4 - 5.3 mmol/L    Chloride 107 94 - 109 mmol/L    Carbon Dioxide 18 (L) 20 - 32 mmol/L    Anion Gap 10 3 - 14 mmol/L    Glucose 163 (H) 70 - 99 mg/dL    Urea Nitrogen 13 7 - 30 mg/dL    Creatinine 0.66 0.66 - 1.25 mg/dL    GFR Estimate >90 >60 mL/min/[1.73_m2]    GFR Estimate If Black >90 >60 mL/min/[1.73_m2]    Calcium 8.0 (L) 8.5 - 10.1 mg/dL   Glucose by meter   Result Value Ref Range    Glucose 160 (H) 70 - 99 mg/dL   Glucose by meter   Result Value Ref Range    Glucose 150 (H) 70 - 99 mg/dL   Glucose by meter   Result Value Ref Range    Glucose 139 (H) 70 - 99 mg/dL   Glucose by meter   Result Value Ref Range    Glucose 155 (H) 70 - 99 mg/dL   Ketone Beta-Hydroxybutyrate Quantitative   Result Value Ref Range    Ketone Quantitative 1.6 (HH) 0.0 - 0.6 mmol/L   Basic metabolic panel   Result Value Ref Range    Sodium 137 133 - 144 mmol/L    Potassium 3.8 3.4 - 5.3 mmol/L    Chloride 109 94 - 109 mmol/L    Carbon Dioxide 18 (L) 20 - 32 mmol/L    Anion Gap 10 3 - 14 mmol/L    Glucose 160 (H) 70 - 99 mg/dL    Urea Nitrogen 14 7 - 30 mg/dL    Creatinine 0.65 (L) 0.66 - 1.25 mg/dL    GFR Estimate >90 >60 mL/min/[1.73_m2]    GFR Estimate If Black >90 >60 mL/min/[1.73_m2]    Calcium 8.0 (L) 8.5 - 10.1 mg/dL   Hepatic panel   Result Value Ref Range    Bilirubin Direct 0.2 0.0 - 0.2 mg/dL    Bilirubin Total  1.1 0.2 - 1.3 mg/dL    Albumin 3.4 3.4 - 5.0 g/dL    Protein Total 6.0 (L) 6.8 - 8.8 g/dL    Alkaline Phosphatase 90 40 - 150 U/L    ALT 33 0 - 70 U/L    AST 17 0 - 45 U/L   Glucose by meter   Result Value Ref Range    Glucose 157 (H) 70 - 99 mg/dL   Glucose by meter   Result Value Ref Range    Glucose 135 (H) 70 - 99 mg/dL   Glucose by meter   Result Value Ref Range    Glucose 161 (H) 70 - 99 mg/dL   Basic metabolic panel   Result Value Ref Range    Sodium 138 133 - 144 mmol/L    Potassium 3.8 3.4 - 5.3 mmol/L    Chloride 110 (H) 94 - 109 mmol/L    Carbon Dioxide 19 (L) 20 - 32 mmol/L    Anion Gap 9 3 - 14 mmol/L    Glucose 211 (H) 70 - 99 mg/dL    Urea Nitrogen 13 7 - 30 mg/dL    Creatinine 0.67 0.66 - 1.25 mg/dL    GFR Estimate >90 >60 mL/min/[1.73_m2]    GFR Estimate If Black >90 >60 mL/min/[1.73_m2]    Calcium 8.0 (L) 8.5 - 10.1 mg/dL   Phosphorus   Result Value Ref Range    Phosphorus 2.3 (L) 2.5 - 4.5 mg/dL   Glucose by meter   Result Value Ref Range    Glucose 203 (H) 70 - 99 mg/dL   Glucose by meter   Result Value Ref Range    Glucose 171 (H) 70 - 99 mg/dL   Glucose by meter   Result Value Ref Range    Glucose 161 (H) 70 - 99 mg/dL   Glucose by meter   Result Value Ref Range    Glucose 154 (H) 70 - 99 mg/dL   Glucose by meter   Result Value Ref Range    Glucose 186 (H) 70 - 99 mg/dL   Care Coordinator IP Consult    Narrative    Karina Dos Santos RN     12/16/2019  2:54 PM  Care Transition Initial Assessment - RN        Met with: Patient.  DATA   Active Problems:    DKA (diabetic ketoacidoses) (H)       Cognitive Status: awake, alert and oriented.  Primary Care Clinic Name: (needs to establish care)     Contact information and PCP information verified: Yes, has chosen   to establish care with [facility].   Lives With: alone                     Insurance concerns: Insurance Plan concerned about insulin costs   and coverage  ASSESSMENT  Patient currently receives the following services:  None         Identified issues/concerns regarding health management: RN CTS   following d/t DKA and no PCP listed in patient chart. Patient   admitted with c/o nausea & vomiting found to have DKA. Patient is   a daily alcohol drinker and has been seen by CD while in   hospital.   Patient follows closely with endocrine, Dr. Logn, with   [facility], for care. Explained importance of also being established   with primary medicine for additional medical needs. Patient   understanding and agreeable. Patient expressed interest in   obtaining a glucagon kit. Patient currently obtains insulin from   [facility] as this is best for current insurance coverage.   Patient states will be transitioning to own insurance with a high   deductible and is concerned about insulin costs moving forward.   Pharmacy liaison consult placed to review current insulin costs   and for possible available discounts. Encouraged patient to   continue discussion with provider and outpatient pharmacy for   insulin options.   Patient has a glucometer at home. Patient does carb counting.   Patient states is decreasing amount of fast food intake and is   cooking more at home.   Handoff will be given to [facility] clinic care   coordinator for further follow up.   PLAN  Financial costs for the patient include none .  Patient given options and choices for discharge Yes .  Patient/family is agreeable to the plan?  Yes: anxious for   discharge when medically ready.   Patient anticipates discharging to Home .        Patient anticipates needs for home equipment: No  Transportation/person available to transport on day of discharge    is TBD.   Plan/Disposition: Home   Appointments: Scheduled PCP appointment to establish care and   updated to AVS.       Dec31 Office Visit with Rema Mcfadden CNP   Tuesday Dec 31, 2019 8:40 AM        Care  (CTS) will continue to follow as   needed.    Karina Dos Santos MA/CHRIS Case Manager  Inpatient  [facility]   [phone]             Glucose by meter   Result Value Ref Range    Glucose 194 (H) 70 - 99 mg/dL   Glucose by meter   Result Value Ref Range    Glucose 156 (H) 70 - 99 mg/dL   Glucose by meter   Result Value Ref Range    Glucose 138 (H) 70 - 99 mg/dL   Glucose by meter   Result Value Ref Range    Glucose 172 (H) 70 - 99 mg/dL   Glucose by meter   Result Value Ref Range    Glucose 234 (H) 70 - 99 mg/dL

## 2019-12-18 PROBLEM — F10.982 ALCOHOL-INDUCED INSOMNIA (H): Status: ACTIVE | Noted: 2019-12-18

## 2019-12-18 NOTE — DISCHARGE SUMMARY
[facility] Discharge Summary    Awilda Whitaker MRN# [id number]   Age: 24 year old YOB: 1995     Date of Admission:  12/15/2019  Date of Discharge::  12/17/2019 11:42 AM  Admitting Physician:  Karl Chicas MD  Discharge Physician:  Geoff Gardiner MD     Home clinic: [facility]          Admission Diagnoses:   Diabetic ketoacidosis without coma associated with type 1 diabetes mellitus (H) [E10.10]          Discharge Diagnosis:   Principal Problem:    Diabetic ketoacidosis without coma associated with type 1 diabetes mellitus (H)  Active Problems:    Alcohol abuse    Insomnia associated with alcoholism, possibly due to other mood disorder            Procedures:   Insulin drip         Discharge Medications:     Discharge Medication List as of 12/17/2019 10:51 AM      START taking these medications    Details   gabapentin (NEURONTIN) 100 MG capsule Take 2 capsules (200 mg) by mouth 3 times daily as needed for other (alcohol cravings), Disp-30 capsule, R-0, E-Prescribe      traZODone (DESYREL) 50 MG tablet Take 1 tablet (50 mg) by mouth At Bedtime, Disp-30 tablet, R-1, E-Prescribe         CONTINUE these medications which have NOT CHANGED    Details   aspirin (ASA) 325 MG EC tablet Take 325 mg by mouth daily, Historical      insulin detemir (LEVEMIR PEN) 100 UNIT/ML pen Inject 25 Units Subcutaneous At Bedtime, Historical      insulin lispro (HUMALOG PEN) 100 UNIT/ML (1 unit dial) pen Inject 1-10 Units Subcutaneous 4 times daily (with meals and nightly) 1 unit / 10 carbs, Historical      multivitamin w/minerals (THERA-VIT-M) tablet Take 1 tablet by mouth daily, Historical                   Consultations:   No consultations were requested during this admission.  The patient was seen by social work as well as chemical dependency.            Hospital Course:   Awilda Whitaker is a 24-year-old man with prior medical history of type 1 diabetes that was diagnosed at age 6 who  "came to attention in the emergency department on 12/15/2019 with positive ketonuria tests and hyperglycemia in the setting of ongoing vomiting and abdominal discomfort.    In the emergency department, Mr. Whitaker was promptly diagnosed with diabetic ketoacidosis with glucose of 270, venous pH 7.1, lactic acid 4.0.  After initial stabilization including fluid resuscitation, I note that the patient's ketones were 4.9.  He had a significantly low bicarbonate level on the electrolyte panel and initial blood alcohol level was 0.02.  Notably, his anion gap was 15 (normal range).  Following generous fluid resuscitation and initiation of insulin drip, the patient was admitted to the ICU for close monitoring and correction of electrolytes.    Although there was initial concern for alcohol withdrawal, the patient did not actually become overtly tremulous or anterior DTs.      While the medical management was relatively straightforward, my also spent a significant amount of time talking with the patient about alcohol abuse.  He was able to tolerate a low dose of trazodone with some success to help with sleep.  We also talked about the possibility of using gabapentin for \"alcohol cravings\".  He was willing to accept the gabapentin as a possible be useful medication for this issue.  I note that the patient was very open and geovani and talking about his alcohol abuse problem recognizing that it is interrupted at least 1 major relationship for him.  He also reports that he previously has gotten into trouble with using Xanax.  I tried to introduce the idea of going to a psychiatrist for management of possible underlying mood disorder.    BP (!) 133/97 (BP Location: Left arm)   Pulse 74   Temp 97.6  F (36.4  C) (Oral)   Resp 14   Ht 1.905 m (6' 3\")   Wt 80.3 kg (177 lb 0.5 oz)   SpO2 100%   BMI 22.13 kg/m      At the time of discharge, Mr. Whitaker is alert, coherent, non-tremulous, fully appropriate.  He he was with his father " on the date of discharge.  He is pleasant and appears well and generally robust.  There is no apparent jaundice or icterus.  HEENT: No facial muscular asymmetry.  Extraocular is conjugate.  Chest: Clear to auscultation all fields.  Heart: Regular rate and rhythm without rubs murmurs gallops.  Abdomen: Soft without obvious tenderness or mass.  Extremities no obvious edema or gross asymmetry.          Discharge Instructions and Follow-Up:   Discharge diet: Regular   Discharge activity: Activity as tolerated   Discharge follow-up:  I encouraged follow-up with his primary physician in the next week or 2.  More importantly at this point, the patient needs to establish care for his diabetes mellitus.   -Diabetic diet  -Continue home diabetes regimen  -Start sliding-scale insulin.  In addition, I of course spent some time talking with him about putting some energy into managing his alcohol abuse.  I told him very clearly that no amount of alcohol would be safer for him.  I also told the family that it would be to his benefit if they could not drink in his presence.           Discharge Disposition:   Discharged to home      Attestation:  I have reviewed today's vital signs, notes, medications, labs and imaging.  Total time: 45 minutes    Geoff Gardiner MD

## 2019-12-19 ENCOUNTER — TELEPHONE (OUTPATIENT)
Dept: FAMILY MEDICINE | Facility: CLINIC | Age: 24
End: 2019-12-19

## 2019-12-19 NOTE — PROGRESS NOTES
Transition Communication Hand-off for Care Transitions to Next Level of Care Provider    Name: Awilda Whitaker  : 1995  MRN #: [id number]  Primary Care Provider: [facility]     Primary Clinic: [address] [place] [address]  Primary Care Clinic Name: (needs to establish care)  Reason for Hospitalization:  Diabetic ketoacidosis without coma associated with type 1 diabetes mellitus (H) [E10.10]  Admit Date/Time: 12/15/2019  7:28 PM  Discharge Date: 19  Payor Source: Payor: BCBS / Plan: BCBS OUT OF STATE / Product Type: Indemnity /     Readmission Assessment Measure (BLADIMIR) Risk Score/category: Low           Reason for Communication Hand-off Referral: Fragility    Discharge Plan: Home       Concern for non-adherence with plan of care:   Yes  Discharge Needs Assessment:  Needs      Most Recent Value   Anticipated Changes Related to Illness  none   # of Referrals Placed by CTS  Scheduled Follow-up appointments          Already enrolled in Tele-monitoring program and name of program:  NA  Follow-up specialty is recommended: Yes    Follow-up plan:    Future Appointments   Date Time Provider Department Center   2019  8:40 AM [facility]       Any outstanding tests or procedures:             Key Recommendations:  RN CTS following d/t DKA and no PCP listed in patient chart. Patient admitted with c/o nausea & vomiting found to have DKA. Patient is a daily alcohol drinker and has been seen by CD while in hospital.   Patient follows closely with endocrine, Dr. Long, with [facility], for care. Explained importance of also being established with primary medicine for additional medical needs. Patient understanding and agreeable. Patient expressed interest in obtaining a glucagon kit. Patient currently obtains insulin from [facility] as this is best for current insurance coverage. Patient states will be transitioning to own insurance with a high deductible and is  concerned about insulin costs moving forward. Pharmacy liaison consult placed to review current insulin costs and for possible available discounts. Encouraged patient to continue discussion with provider and outpatient pharmacy for insulin options.   Patient has a glucometer at home. Patient does carb counting. Patient states is decreasing amount of fast food intake and is cooking more at home.     Patient will establish care at [facility].   Please follow up with patient for CD follow up, diabetes management, and medication resources. Patient was discharged home on gabapentin.     Karina Dos Santos MA/RN Case Manager  Inpatient Care Coordination  [facility]   [phone]    AVS/Discharge Summary is the source of truth; this is a helpful guide for improved communication of patient story

## 2019-12-19 NOTE — TELEPHONE ENCOUNTER
Please call patient for Inpatient Discharge f/u 12/17/19. Diabetic Ketoacidosis Without Coma Associated With Type 1 Diabetes Mel[place]. Other Insomnia. Ruth Behrens.

## 2019-12-20 ENCOUNTER — PATIENT OUTREACH (OUTPATIENT)
Dept: CARE COORDINATION | Facility: CLINIC | Age: 24
End: 2019-12-20

## 2019-12-20 DIAGNOSIS — E10.10 DIABETIC KETOACIDOSIS WITHOUT COMA ASSOCIATED WITH TYPE 1 DIABETES MELLITUS (H): Primary | ICD-10-CM

## 2019-12-20 NOTE — PROGRESS NOTES
Clinic Care Coordination Contact  [facility]/Voicemail       Clinical Data: Care Coordinator Outreach  Patient admitted to [facility] 12/15-12/17 for treatment of DKA.  Patient has history of DM type 1 and ETOH abuse.  Patient is daily drinker.  Patient followed by CD while inpatient.  Patient does not currently have PCP- scheduled to establish care with Nina Mcfadden NP on 12/31.  Follows endocrinology- Dr. Long with [facility].    Patient was recommended to establish care with therapist as he declined CD treatment resources.  Per CD note, resources were emailed to him at his request.  He did was not interested in AA.    Outreach attempted x 1.  Left message on patient's voicemail with call back information and requested return call.  Plan:  Care Coordinator will try to reach patient again in 1-2 business days.    Bailey Phillips RN  Care Coordination  Phone:  [phone]  Email: [email or web address]  [facility], [place], [place] and [facility]

## 2019-12-20 NOTE — LETTER
[facility] CARE COORDINATION  [address] [place] [address]  December 26, 2019    Awilda Whitaker  [address]  [place] [address]      Dear Awilda,    I am a clinic care coordinator who works with [facility]. I have been trying to reach you recently to introduce Clinic Care Coordination and to see if there was anything I could assist you with.  I wanted to introduce myself and provide you with my contact information so that you can call me with questions or concerns about your health care. Below is a description of clinic care coordination and how I can further assist you.     The clinic care coordinator is a registered nurse and/or  who understand the health care system. The goal of clinic care coordination is to help you manage your health and improve access to the [facility] system in the most efficient manner. The registered nurse can assist you in meeting your health care goals by providing education, coordinating services, and strengthening the communication among your providers. The  can assist you with financial, behavioral, psychosocial, chemical dependency, counseling, and/or psychiatric resources.    Please feel free to contact me at [phone], with any questions or concerns. We at [facility] are focused on providing you with the highest-quality healthcare experience possible and that all starts with you.     Sincerely,     Bailey Phillips RN  Care Coordination  Phone:  [phone]  Email: [email or web address]  [facility], [place], [place] and [facility]

## 2019-12-26 NOTE — PROGRESS NOTES
Clinic Care Coordination Contact  [facility]/Voicemail    Referral Source: IP Handoff  Clinical Data: Care Coordinator Outreach  Outreach attempted x 2.  Left message on patient's voicemail with call back information and requested return call.  Plan: Care Coordinator will send care coordination introduction letter with care coordinator contact information and explanation of care coordination services via mail. Care Coordinator will do no further outreaches at this time.    Bailey Phillips RN  Care Coordination  Phone:  [phone]  Email: [email or web address]  [facility], [place] and [facility]
